# Patient Record
Sex: FEMALE | Race: AMERICAN INDIAN OR ALASKA NATIVE
[De-identification: names, ages, dates, MRNs, and addresses within clinical notes are randomized per-mention and may not be internally consistent; named-entity substitution may affect disease eponyms.]

---

## 2017-06-05 ENCOUNTER — HOSPITAL ENCOUNTER (OUTPATIENT)
Dept: HOSPITAL 5 - MAMMO | Age: 62
Discharge: HOME | End: 2017-06-05
Attending: FAMILY MEDICINE
Payer: COMMERCIAL

## 2017-06-05 DIAGNOSIS — Z87.891: ICD-10-CM

## 2017-06-05 DIAGNOSIS — Z12.31: Primary | ICD-10-CM

## 2017-06-05 DIAGNOSIS — I10: ICD-10-CM

## 2017-06-05 PROCEDURE — 77067 SCR MAMMO BI INCL CAD: CPT

## 2017-06-05 PROCEDURE — G0202 SCR MAMMO BI INCL CAD: HCPCS

## 2017-06-07 NOTE — MAMMOGRAPHY REPORT
BILATERAL MAMMOGRAM with CAD:



HISTORY:

Cancer screening. Comparison study is dated May 28, 2015.



FINDINGS:

The breast tissue is heterogeneously dense, which could obscure

detection of small masses (approximately 50%-75% glandular).  No mass, 

distortion, suspicious calcification, or skin change is seen.



IMPRESSION:

Negative mammogram.  There is no mammographic evidence of

malignancy.



RECOMMENDATION:

Follow-up per ACS guidelines.



BI-RADS CATEGORY:  1 = Negative



ACR BI-RADS MAMMOGRAPHIC CODES:

0 = Needs additional imaging evaluation; 1 = Negative; 2 = Benign; 3 = 

Probably benign; 4 = Suspicious; 5 = Malignant; 6 = Known biopsy-proven 

malignancy



COMMENT:

      1.   Dense breast tissue, i.e., adenosis, fibrocystic 

            changes, etc., may obscure an underlying neoplasm.

      2.   Approximately 10% of cancers are not detected with

            mammography.

      3.   A negative mammography report should not delay biopsy 

            if a clinically suspicious mass is present.



COMMENT:

Patient follow-up letters are generated in [employer].

## 2018-08-13 ENCOUNTER — HOSPITAL ENCOUNTER (OUTPATIENT)
Dept: HOSPITAL 5 - MAMMO | Age: 63
Discharge: HOME | End: 2018-08-13
Attending: FAMILY MEDICINE
Payer: COMMERCIAL

## 2018-08-13 DIAGNOSIS — Z12.31: Primary | ICD-10-CM

## 2018-08-13 PROCEDURE — 77067 SCR MAMMO BI INCL CAD: CPT

## 2018-08-13 NOTE — MAMMOGRAPHY REPORT
BILATERAL DIGITAL SCREENING MAMMOGRAM with CAD: 08/13/18 08:01:00



CLINICAL: Routine screening.



COMPARISON:06/05/17



FINDINGS: The breasts are heterogeneously dense, which may obscure 

small masses. No mass, architectural distortion or suspicious 

calcifications.



IMPRESSION: No mammographic evidence of malignancy.



BI-RADS CATEGORY: 1 - - Negative



RECOMMENDATION: Routine mammographic screening in one year.





COMMENT:

Patient follow-up letters are generated by our "[employer] application.

## 2019-09-27 ENCOUNTER — HOSPITAL ENCOUNTER (OUTPATIENT)
Dept: HOSPITAL 5 - MAMMO | Age: 64
Discharge: HOME | End: 2019-09-27
Attending: FAMILY MEDICINE
Payer: COMMERCIAL

## 2019-09-27 DIAGNOSIS — Z12.31: Primary | ICD-10-CM

## 2019-09-27 PROCEDURE — 77067 SCR MAMMO BI INCL CAD: CPT

## 2019-09-30 NOTE — MAMMOGRAPHY REPORT
DIGITAL SCREENING MAMMOGRAM WITH CAD, 9/27/2019



INDICATION: Routine screening mammography. 



TECHNIQUE:  Digital bilateral  2D mammography was obtained in the craniocaudal and mediolateral obliq
ue projections. This examination was interpreted with the benefit of Computer-Aided Detection analysi
s.



COMPARISON: 8/13/2018



FINDINGS: 



Breast Density: The breasts are heterogeneously dense, which may obscure small masses.



There is no evidence of dominant mass, suspicious calcifications or architectural distortion in eithe
r breast. A few scattered bilateral benign calcifications.



IMPRESSION: No mammographic evidence of malignancy.



Follow up recommendation: Routine yearly



BI-RADS Category 2:  Benign.



A "normal" or negative report should not discourage follow up or biopsy of a clinically significant f
inding.



A written summary of these findings will be mailed to the patient. The patient will be entered into a
 mammography reporting system which will generate a reminder letter for the patient's next appointmen
t at the appropriate interval.



The American College of Radiology recommends yearly mammograms starting at age 40 and continuing as l
augustine as a woman is in good health.  Breast MRI is recommended for women with an approximate 20-25% or 
greater lifetime risk of breast cancer, including women with a strong family history of breast or ova
daniela cancer or who have been treated for Hodgkin's disease.



Signer Name: Serafin Gaffney MD 

Signed: 9/30/2019 10:30 AM

 Workstation Name: [id number]

## 2020-09-28 ENCOUNTER — HOSPITAL ENCOUNTER (OUTPATIENT)
Dept: HOSPITAL 5 - MAMMO | Age: 65
Discharge: HOME | End: 2020-09-28
Attending: FAMILY MEDICINE
Payer: COMMERCIAL

## 2020-09-28 DIAGNOSIS — Z12.31: Primary | ICD-10-CM

## 2020-09-28 PROCEDURE — 77067 SCR MAMMO BI INCL CAD: CPT

## 2020-09-28 NOTE — MAMMOGRAPHY REPORT
DIGITAL SCREENING MAMMOGRAM WITH CAD, 9/28/2020



INDICATION: Routine screening mammography. 



TECHNIQUE:  Digital bilateral  2D mammography was obtained in the craniocaudal and mediolateral obliq
ue projections. This examination was interpreted with the benefit of Computer-Aided Detection analysi
s.



COMPARISON: Prior mammograms 9/27/2019 and 8/13/2018



FINDINGS: 



Breast Density: The breasts are heterogeneously dense, which may obscure small masses.



There is no evidence of dominant mass, suspicious calcifications or architectural distortion in eithe
r breast. There has been no significant change compared with the prior examinations.



IMPRESSION:



Follow up recommendation: Routine yearly



BI-RADS Category 1:  Negative.



A "normal" or negative report should not discourage follow up or biopsy of a clinically significant f
inding.



A written summary of these findings will be mailed to the patient. The patient will be entered into a
 mammography reporting system which will generate a reminder letter for the patient's next appointmen
t at the appropriate interval.



The American College of [employer]iology recommends yearly mammograms starting at age 40 and continuing as l
augustine as a woman is in good health.  Breast MRI is recommended for women with an approximate 20-25% or 
greater lifetime risk of breast cancer, including women with a strong family history of breast or ova
daniela cancer or who have been treated for Hodgkin's disease.



Signer Name: Eva Renae MD 

Signed: 9/28/2020 10:20 AM

Workstation Name: [id number]

## 2021-06-09 ENCOUNTER — HOSPITAL ENCOUNTER (INPATIENT)
Dept: HOSPITAL 5 - ED | Age: 66
LOS: 7 days | Discharge: ANOTHER HOSPITAL | DRG: 286 | End: 2021-06-16
Attending: INTERNAL MEDICINE | Admitting: INTERNAL MEDICINE
Payer: COMMERCIAL

## 2021-06-09 DIAGNOSIS — Z79.899: ICD-10-CM

## 2021-06-09 DIAGNOSIS — I65.29: ICD-10-CM

## 2021-06-09 DIAGNOSIS — Z87.891: ICD-10-CM

## 2021-06-09 DIAGNOSIS — I25.10: ICD-10-CM

## 2021-06-09 DIAGNOSIS — G89.29: ICD-10-CM

## 2021-06-09 DIAGNOSIS — E78.5: ICD-10-CM

## 2021-06-09 DIAGNOSIS — Z83.3: ICD-10-CM

## 2021-06-09 DIAGNOSIS — I50.31: ICD-10-CM

## 2021-06-09 DIAGNOSIS — J18.9: ICD-10-CM

## 2021-06-09 DIAGNOSIS — J90: ICD-10-CM

## 2021-06-09 DIAGNOSIS — Z79.891: ICD-10-CM

## 2021-06-09 DIAGNOSIS — E11.65: ICD-10-CM

## 2021-06-09 DIAGNOSIS — N17.0: ICD-10-CM

## 2021-06-09 DIAGNOSIS — Z79.82: ICD-10-CM

## 2021-06-09 DIAGNOSIS — E87.2: ICD-10-CM

## 2021-06-09 DIAGNOSIS — J96.01: ICD-10-CM

## 2021-06-09 DIAGNOSIS — I42.9: ICD-10-CM

## 2021-06-09 DIAGNOSIS — E11.51: ICD-10-CM

## 2021-06-09 DIAGNOSIS — I11.0: Primary | ICD-10-CM

## 2021-06-09 DIAGNOSIS — R77.8: ICD-10-CM

## 2021-06-09 DIAGNOSIS — R79.89: ICD-10-CM

## 2021-06-09 DIAGNOSIS — M54.9: ICD-10-CM

## 2021-06-09 DIAGNOSIS — Z82.49: ICD-10-CM

## 2021-06-09 LAB
ALBUMIN SERPL-MCNC: 3.9 G/DL (ref 3.9–5)
ALT SERPL-CCNC: 29 UNITS/L (ref 7–56)
BASOPHILS # (AUTO): 0.1 K/MM3 (ref 0–0.1)
BASOPHILS NFR BLD AUTO: 0.9 % (ref 0–1.8)
BUN SERPL-MCNC: 27 MG/DL (ref 7–17)
BUN/CREAT SERPL: 17 %
CALCIUM SERPL-MCNC: 9.1 MG/DL (ref 8.4–10.2)
EOSINOPHIL # BLD AUTO: 0.1 K/MM3 (ref 0–0.4)
EOSINOPHIL NFR BLD AUTO: 1.1 % (ref 0–4.3)
HCT VFR BLD CALC: 34.9 % (ref 30.3–42.9)
HDLC SERPL-MCNC: 53 MG/DL (ref 40–59)
HEMOLYSIS INDEX: 8
HGB BLD-MCNC: 11.4 GM/DL (ref 10.1–14.3)
LYMPHOCYTES # BLD AUTO: 1.3 K/MM3 (ref 1.2–5.4)
LYMPHOCYTES NFR BLD AUTO: 14.7 % (ref 13.4–35)
MCHC RBC AUTO-ENTMCNC: 33 % (ref 30–34)
MCV RBC AUTO: 95 FL (ref 79–97)
MONOCYTES # (AUTO): 0.7 K/MM3 (ref 0–0.8)
MONOCYTES % (AUTO): 8.6 % (ref 0–7.3)
PLATELET # BLD: 412 K/MM3 (ref 140–440)
RBC # BLD AUTO: 3.69 M/MM3 (ref 3.65–5.03)

## 2021-06-09 PROCEDURE — 85610 PROTHROMBIN TIME: CPT

## 2021-06-09 PROCEDURE — 80048 BASIC METABOLIC PNL TOTAL CA: CPT

## 2021-06-09 PROCEDURE — 85027 COMPLETE CBC AUTOMATED: CPT

## 2021-06-09 PROCEDURE — 87040 BLOOD CULTURE FOR BACTERIA: CPT

## 2021-06-09 PROCEDURE — 71046 X-RAY EXAM CHEST 2 VIEWS: CPT

## 2021-06-09 PROCEDURE — 76770 US EXAM ABDO BACK WALL COMP: CPT

## 2021-06-09 PROCEDURE — C1894 INTRO/SHEATH, NON-LASER: HCPCS

## 2021-06-09 PROCEDURE — 81001 URINALYSIS AUTO W/SCOPE: CPT

## 2021-06-09 PROCEDURE — 87641 MR-STAPH DNA AMP PROBE: CPT

## 2021-06-09 PROCEDURE — 80053 COMPREHEN METABOLIC PANEL: CPT

## 2021-06-09 PROCEDURE — 93454 CORONARY ARTERY ANGIO S&I: CPT

## 2021-06-09 PROCEDURE — 96375 TX/PRO/DX INJ NEW DRUG ADDON: CPT

## 2021-06-09 PROCEDURE — 93017 CV STRESS TEST TRACING ONLY: CPT

## 2021-06-09 PROCEDURE — 93306 TTE W/DOPPLER COMPLETE: CPT

## 2021-06-09 PROCEDURE — 96365 THER/PROPH/DIAG IV INF INIT: CPT

## 2021-06-09 PROCEDURE — 84300 ASSAY OF URINE SODIUM: CPT

## 2021-06-09 PROCEDURE — 36415 COLL VENOUS BLD VENIPUNCTURE: CPT

## 2021-06-09 PROCEDURE — 99406 BEHAV CHNG SMOKING 3-10 MIN: CPT

## 2021-06-09 PROCEDURE — A9502 TC99M TETROFOSMIN: HCPCS

## 2021-06-09 PROCEDURE — 82962 GLUCOSE BLOOD TEST: CPT

## 2021-06-09 PROCEDURE — G0378 HOSPITAL OBSERVATION PER HR: HCPCS

## 2021-06-09 PROCEDURE — 82570 ASSAY OF URINE CREATININE: CPT

## 2021-06-09 PROCEDURE — 85025 COMPLETE CBC W/AUTO DIFF WBC: CPT

## 2021-06-09 PROCEDURE — 80061 LIPID PANEL: CPT

## 2021-06-09 PROCEDURE — 93005 ELECTROCARDIOGRAM TRACING: CPT

## 2021-06-09 PROCEDURE — 71045 X-RAY EXAM CHEST 1 VIEW: CPT

## 2021-06-09 PROCEDURE — 84484 ASSAY OF TROPONIN QUANT: CPT

## 2021-06-09 PROCEDURE — 83880 ASSAY OF NATRIURETIC PEPTIDE: CPT

## 2021-06-09 PROCEDURE — 78452 HT MUSCLE IMAGE SPECT MULT: CPT

## 2021-06-09 NOTE — XRAY REPORT
CHEST 2 VIEWS 



INDICATION / CLINICAL INFORMATION: shortness of breath, cough.



COMPARISON: 7/2/2020



FINDINGS:



SUPPORT DEVICES: None.

HEART / MEDIASTINUM: Stable. 

LUNGS / PLEURA: Interval development of confluent infiltrate in the right lung base. Small bilateral 
pleural effusions, right minimally greater than left.. No pneumothorax. 



ADDITIONAL FINDINGS: No significant additional findings.



IMPRESSION:

1. Interval development of confluent infiltrate in the right lung base likely represents an infectiou
s process.

2. Interval development of small bilateral pleural effusions with associated compressive atelectasis.




Signer Name: Zak Escobedo MD 

Signed: 6/9/2021 2:03 PM

Workstation Name: [id number]

## 2021-06-09 NOTE — EVENT NOTE
ED Screening Note


Date of service: 06/09/21


Time: 13:37


ED Screening Note: 





Patient complains of shortness of breath starting last night


States she has had a nonproductive cough for 1 week


Denies any chest pain or fever


History of diabetes and hypertension


No recent long travel, hemoptysis, leg pain/swelling, or history of DVT/PE





This initial assessment/diagnostic orders/clinical plan/treatment(s) is/are 

subject to change based on patients health status, clinical progression and 

re-assessment by fellow clinical providers in the ED. Further treatment and 

workup at subsequent clinical providers discretion. Patient/guardian urged not 

to elope from the ED as their condition may be serious if not clinically 

assessed and managed. 





Initial orders include: 


Labs


Chest x-ray

## 2021-06-09 NOTE — EMERGENCY DEPARTMENT REPORT
ED Shortness of Breath HPI





- General


Chief Complaint: Dyspnea/Respdistress


Stated Complaint: SOB


Time Seen by Provider: 06/09/21 13:36


Source: patient


Mode of arrival: Ambulatory


Limitations: No Limitations





- History of Present Illness


Initial Comments: 





Patient is a 65-year-old female that presents emergency room with complaints of 

shortness of breath.  Patient states shortness of breath started yesterday.  

Patient states her shortness of breath is worse.  Patient also complains of 

fatigue.  Patient states approximate week ago she had a cough and cold but that 

has improved and now she is just has a cough and shortness of breath.  Patient 

states she has not been tested for COVID-19.  Patient dates she has had both of 

her doses of her COVID-19 vaccine.  Patient states her shortness breath better 

with rest and worse with exertion.  Patient states her fatigue is better with 

rest and worse with exertion and movement.  Patient denies fever and chills.








Patient denies recent travel.  Patient denies recent international travel.  

Patient denies exposure to the novel coronavirus.  Patient denies sick contacts.

 Patient denies fever and chills.  Patient denies loss of smell..  Patient 

denies diarrhea.  Patient denies coming in contact with anybody with symptoms of

the novel coronavirus.





Patient denies chest pain. 








MD Complaint: shortness of breath


-: Sudden





- Related Data


                                Home Medications











 Medication  Instructions  Recorded  Confirmed  Last Taken


 


Bisoprolol/Hctz (Nf) [Ziac 5/6.25 1 tab PO QDAY 08/30/16 07/02/20 11/08/16 05:00





(Nf)]    


 


Ibuprofen [Motrin 800 MG tab] 800 mg PO TID 08/30/16 07/02/20 11/06/16





    400 MG


 


Multivit-Min/FA/Lycopen/Lutein 1 each PO QDAY 08/30/16 07/02/20 11/07/16





[Centrum Silver Tablet]    


 


Nesina 25 mg PO DAILY 09/02/16 07/02/20 11/07/16








                                  Previous Rx's











 Medication  Instructions  Recorded  Last Taken  Type


 


lisinopriL [Zestril TAB] 20 mg PO QDAY #30 tablet 02/14/14 Unknown Rx


 


HYDROcodone/APAP 5-325 [[place] 1 - 2 each PO Q4HR PRN #30 tablet 11/08/16 Unknown

 Rx





5-325 mg TAB]    


 


Aspirin EC [Ecotrin] 325 mg PO QDAY #30 tablet 07/05/20 Unknown Rx


 


NIFEdipine XL [Procardia Xl] 30 mg PO Q12HR #60 tablet 07/05/20 Unknown Rx


 


Pravastatin [Pravachol] 80 mg PO QHS #30 tablet 07/05/20 Unknown Rx


 


hydrALAZINE [Apresoline TAB] 75 mg PO Q8HR #90 tablet 07/05/20 Unknown Rx











                                    Allergies











Allergy/AdvReac Type Severity Reaction Status Date / Time


 


No Known Allergies Allergy   Verified 02/12/14 15:34














ED Review of Systems


ROS: 


Stated complaint: SOB


Other details as noted in HPI





Constitutional: denies: chills, fever


Eyes: denies: eye pain, eye discharge, vision change


ENT: denies: ear pain, throat pain


Respiratory: see HPI, cough, shortness of breath, SOB with exertion, SOB at 

rest.  denies: wheezing


Cardiovascular: denies: chest pain, palpitations


Endocrine: no symptoms reported


Gastrointestinal: denies: abdominal pain, nausea, diarrhea


Genitourinary: denies: urgency, dysuria, discharge


Musculoskeletal: denies: back pain, joint swelling, arthralgia


Skin: denies: rash, lesions


Neurological: denies: headache, weakness, paresthesias


Psychiatric: denies: anxiety, depression


Hematological/Lymphatic: denies: easy bleeding, easy bruising





ED Past Medical Hx





- Past Medical History


Previous Medical History?: Yes


Hx Hypertension: Yes


Hx Heart Attack/AMI: No


Hx Congestive Heart Failure: No


Hx Diabetes: Yes


Hx Deep Vein Thrombosis: No


Hx Pulmonary Embolism: No


Hx GERD: No


Hx Liver Disease: No


Hx Renal Disease: No


Hx Sickle Cell Disease: No


Hx Arthritis: No


Hx Headaches / Migraines: No


Hx Seizures: No


Hx Kidney Stones: No


Hx Asthma: No


Hx COPD: No


Hx Tuberculosis: No


Hx Dementia: No


Hx HIV: No


Additional medical history: hyperlipidemia





- Surgical History


Past Surgical History?: No


Hx Coronary Stent: No


Hx Open Heart Surgery: No


Hx Pacemaker: No


Hx Internal Defibrillator: No


Hx Cholecystectomy: No


Hx Appendectomy: No


Hx Breast Surgery: No





- Family History


Family history: no significant





- Social History


Smoking Status: Former Smoker


Substance Use Type: None





- Medications


Home Medications: 


                                Home Medications











 Medication  Instructions  Recorded  Confirmed  Last Taken  Type


 


lisinopriL [Zestril TAB] 20 mg PO QDAY #30 tablet 02/14/14 07/02/20 Unknown Rx


 


Bisoprolol/Hctz (Nf) [Ziac 5/6.25 1 tab PO QDAY 08/30/16 07/02/20 11/08/16 05:00

 History





(Nf)]     


 


Ibuprofen [Motrin 800 MG tab] 800 mg PO TID 08/30/16 07/02/20 11/06/16 History





    400 MG 


 


Multivit-Min/FA/Lycopen/Lutein 1 each PO QDAY 08/30/16 07/02/20 11/07/16 History





[Centrum Silver Tablet]     


 


Nesina 25 mg PO DAILY 09/02/16 07/02/20 11/07/16 History


 


HYDROcodone/APAP 5-325 [[place] 1 - 2 each PO Q4HR PRN #30 tablet 11/08/16 07/02/20 Unknown Rx





5-325 mg TAB]     


 


Aspirin EC [Ecotrin] 325 mg PO QDAY #30 tablet 07/05/20  Unknown Rx


 


NIFEdipine XL [Procardia Xl] 30 mg PO Q12HR #60 tablet 07/05/20  Unknown Rx


 


Pravastatin [Pravachol] 80 mg PO QHS #30 tablet 07/05/20  Unknown Rx


 


hydrALAZINE [Apresoline TAB] 75 mg PO Q8HR #90 tablet 07/05/20  Unknown Rx














ED Physical Exam





- General


Limitations: No Limitations


General appearance: alert, in no apparent distress





- Head


Head exam: Present: atraumatic, normocephalic





- Eye


Eye exam: Present: normal appearance





- ENT


ENT exam: Present: mucous membranes moist





- Neck


Neck exam: Present: normal inspection





- Respiratory


Respiratory exam: Present: normal lung sounds bilaterally.  Absent: respiratory 

distress





- Cardiovascular


Cardiovascular Exam: Present: regular rate, normal rhythm.  Absent: systolic 

murmur, diastolic murmur, rubs, gallop





- GI/Abdominal


GI/Abdominal exam: Present: soft, normal bowel sounds





- Extremities Exam


Extremities exam: Present: normal inspection





- Back Exam


Back exam: Present: normal inspection





- Neurological Exam


Neurological exam: Present: alert, oriented X3





- Psychiatric


Psychiatric exam: Present: normal affect, normal mood





- Skin


Skin exam: Present: warm, dry, intact, normal color.  Absent: rash





ED Course


                                   Vital Signs











  06/09/21 06/09/21 06/09/21





  13:32 13:38 20:35


 


Temperature  99.2 F 


 


Pulse Rate 106 H 107 H 88


 


Respiratory  18 19





Rate   


 


Blood Pressure   


 


Blood Pressure  166/98 





[Right]   


 


O2 Sat by Pulse 98 98 98





Oximetry   














  06/09/21 06/09/21 06/09/21





  20:45 21:01 21:15


 


Temperature   


 


Pulse Rate 89 90 94 H


 


Respiratory 18 20 19





Rate   


 


Blood Pressure 154/98 165/113 165/103


 


Blood Pressure   





[Right]   


 


O2 Sat by Pulse 96 96 94





Oximetry   














  06/09/21 06/09/21





  21:31 21:45


 


Temperature  


 


Pulse Rate 91 H 93 H


 


Respiratory 19 18





Rate  


 


Blood Pressure 164/104 167/101


 


Blood Pressure  





[Right]  


 


O2 Sat by Pulse 95 96





Oximetry  














- Reevaluation(s)


Reevaluation #1: 


I discussed all results with patient.  I discussed plan of care with patient.  

Patient agrees with plan of care and admission.  Patient to be admitted to the 

hospitalist service.


06/09/21 23:08








- Consultations


Consultation #1: 


Hospitalist consulted for admission.  Hospitalist to admit patient.


06/09/21 23:08





Consultation #2: 


Nephrology consulted.


06/09/21 23:08








ED Medical Decision Making





- Lab Data


Result diagrams: 


                                 06/09/21 14:27





                                 06/09/21 14:27





- EKG Data


-: EKG Interpreted by Me


EKG shows normal: sinus rhythm, axis, intervals, QRS complexes, ST-T waves


Rate: normal





- EKG Data


Interpretation: LVH





- Radiology Data


Radiology results: report reviewed, image reviewed


interpreted by me: 





Chest x-ray:  pneumonia noted, no pneumothorax, no foreign body, no osseous 

findings, pulmonary edema noted











 


CHEST 2 VIEWS   


 


 INDICATION / CLINICAL INFORMATION: shortness of breath, cough.  


 


 COMPARISON: 7/2/2020  


 


 FINDINGS:  


 


 SUPPORT DEVICES: None.  


 HEART / MEDIASTINUM: Stable.   


 LUNGS / PLEURA: Interval development of confluent infiltrate in the right lung 

base. Small 


bilateral pleural effusions, right minimally greater than left.. No 

pneumothorax.   


 


 ADDITIONAL FINDINGS: No significant additional findings.  


 


 IMPRESSION:  


 1. Interval development of confluent infiltrate in the right lung base likely 

represents an 


infectious process.  


 2. Interval development of small bilateral pleural effusions with associated 

compressive 


atelectasis.  


=========================================================================











- Medical Decision Making





Patient is a 65-year-old female that presents emergency room with complaints of 

shortness of breath and cough.  Patient states she had a cold symptoms about a 

week ago but those have improved.  Patient states only symptoms she has 

shortness of breath cough fatigue.  Patient already had a COVID-19 vaccine.  

Patient had labs done which were consistent with acute renal failure and 

elevated troponin.  Patient also found to have an elevated BNP.  Patient had 

chest x-ray which shows pneumonia and pulmonary edema.  I personally reviewed 

the chest x-ray.  Patient had an EKG done which shows LVH and a normal sinus 

rhythm.  Patient does not have any ST changes.  I personally reviewed the EKG.  

Patient given antibiotics and IV Lasix in the ER.  Patient admitted to the 

hospital service for further evaluation treatment.





Critical care time documented due to the multiple reassessments, prolonged time 

at the bedside, interpretation of diagnostics and labs.











- Differential Diagnosis


Pneumonia, shortness of breath, cough, URI, CHF,


Critical Care Time: Yes


Critical care time in (mins) excluding proc time.: 35


Critical care attestation.: 


If time is entered above; I have spent that time in minutes in the direct care 

of this critically ill patient, excluding procedure time.





Critical Care Time: 





35 minutes











ED Disposition


Clinical Impression: 


 SOB (shortness of breath), Cough, New onset of congestive heart failure, 

Pulmonary edema cardiac cause, Elevated troponin I level





Renal failure


Qualifiers:


 Renal failure chronicity: acute Acute renal failure type: unspecified Qualified

 Code(s): N17.9 - Acute kidney failure, unspecified





Pneumonia


Qualifiers:


 Pneumonia type: due to unspecified organism Laterality: right Lung location: 

unspecified part of lung Qualified Code(s): J18.9 - Pneumonia, unspecified 

organism





Disposition: DC-09 OP ADMIT IP TO THIS HOSP


Is pt being admited?: Yes


Does the pt Need Aspirin: No


Condition: Stable


Time of Disposition: 23:10

## 2021-06-10 LAB
BUN SERPL-MCNC: 28 MG/DL (ref 7–17)
BUN/CREAT SERPL: 20 %
CALCIUM SERPL-MCNC: 9.5 MG/DL (ref 8.4–10.2)
HEMOLYSIS INDEX: 20

## 2021-06-10 RX ADMIN — FUROSEMIDE SCH MG: 10 INJECTION, SOLUTION INTRAVENOUS at 06:09

## 2021-06-10 RX ADMIN — HEPARIN SODIUM SCH UNIT: 5000 INJECTION, SOLUTION INTRAVENOUS; SUBCUTANEOUS at 10:45

## 2021-06-10 RX ADMIN — ASPIRIN SCH MG: 325 TABLET, COATED ORAL at 10:39

## 2021-06-10 RX ADMIN — FUROSEMIDE SCH MG: 10 INJECTION, SOLUTION INTRAVENOUS at 18:55

## 2021-06-10 RX ADMIN — INSULIN LISPRO SCH UNIT: 100 INJECTION, SOLUTION INTRAVENOUS; SUBCUTANEOUS at 12:00

## 2021-06-10 RX ADMIN — LISINOPRIL SCH MG: 20 TABLET ORAL at 10:39

## 2021-06-10 RX ADMIN — DOCUSATE SODIUM SCH MG: 100 CAPSULE, LIQUID FILLED ORAL at 10:51

## 2021-06-10 RX ADMIN — DOCUSATE SODIUM SCH MG: 100 CAPSULE, LIQUID FILLED ORAL at 22:05

## 2021-06-10 RX ADMIN — INSULIN LISPRO SCH UNIT: 100 INJECTION, SOLUTION INTRAVENOUS; SUBCUTANEOUS at 10:52

## 2021-06-10 RX ADMIN — FAMOTIDINE SCH MG: 10 TABLET ORAL at 22:05

## 2021-06-10 RX ADMIN — Medication SCH ML: at 10:50

## 2021-06-10 RX ADMIN — INSULIN LISPRO SCH UNIT: 100 INJECTION, SOLUTION INTRAVENOUS; SUBCUTANEOUS at 18:57

## 2021-06-10 RX ADMIN — HEPARIN SODIUM SCH UNIT: 5000 INJECTION, SOLUTION INTRAVENOUS; SUBCUTANEOUS at 22:06

## 2021-06-10 RX ADMIN — METOPROLOL TARTRATE SCH MG: 25 TABLET, FILM COATED ORAL at 22:02

## 2021-06-10 RX ADMIN — INSULIN LISPRO SCH: 100 INJECTION, SOLUTION INTRAVENOUS; SUBCUTANEOUS at 22:06

## 2021-06-10 RX ADMIN — FAMOTIDINE SCH MG: 10 TABLET ORAL at 10:39

## 2021-06-10 RX ADMIN — CEFTRIAXONE SODIUM SCH MLS/HR: 1 INJECTION, POWDER, FOR SOLUTION INTRAMUSCULAR; INTRAVENOUS at 10:31

## 2021-06-10 RX ADMIN — NIFEDIPINE SCH MG: 30 TABLET, FILM COATED, EXTENDED RELEASE ORAL at 22:05

## 2021-06-10 RX ADMIN — NIFEDIPINE SCH MG: 30 TABLET, FILM COATED, EXTENDED RELEASE ORAL at 10:46

## 2021-06-10 RX ADMIN — Medication SCH ML: at 22:07

## 2021-06-10 RX ADMIN — METOPROLOL TARTRATE SCH MG: 25 TABLET, FILM COATED ORAL at 10:55

## 2021-06-10 RX ADMIN — PRAVASTATIN SODIUM SCH MG: 80 TABLET ORAL at 22:05

## 2021-06-10 NOTE — HISTORY AND PHYSICAL REPORT
History of Present Illness


Date of examination: 06/09/21


Date of admission: 


06/09/2021


Chief complaint: 





SOB


History of present illness: 





65-year-old -American female who was a former smoker (quit 6 months ago) 

with history of hypertension, DM2, HLD, and chronic back pain who presents [facility] 

ED with complaints of cough and shortness of breath.  Patient complains of 

nonproductive cough x1 week, and thinks that she might have a cold.  

Additionally she complains of progressively worsening shortness of breath which 

started approximately 1 day ago.  Her shortness of breath is exacerbated with 

exertion and relieved with rest.  Denies bilateral lower extremity edema, 

peripheral edema, PND, or orthopnea.  Endorses receiving both doses of COVID-19 

vaccine.  Patient also complains of fatigue with exertion.  Denies any new 

exercise regiment or strenuous activity.  States that she quit smoking 6 months 

ago, and does not use any other form of tobacco products at this time.  Endorses

compliance with meds.





Denies chest pain, sputum production, hemoptysis, fever, chills, abdominal pain,

nausea, vomiting, diarrhea, loss of smell, loss of taste, hematuria, recent 

travel, or recent sick contacts.





Review of medical record shows patient was admitted in July of last year for 

syncope.  Work-up included bilateral carotid Doppler which revealed left greater

than 70% carotid stenosis.  Patient was evaluated by vascular surgery and 

advised to follow-up as outpatient.  Is unclear if patient follow-up as 

instructed.











Past History


Past Medical History: diabetes, hypertension, hyperlipidemia, other (chronic 

back pain)


Past Surgical History: No surgical history


Social history: , lives with family, full code, other (former smoker quit

6 months ago).  denies: alcohol abuse, prescription drug abuse, IV drug use


Family history: hypertension





Medications and Allergies


                                    Allergies











Allergy/AdvReac Type Severity Reaction Status Date / Time


 


No Known Allergies Allergy   Verified 02/12/14 15:34











                                Home Medications











 Medication  Instructions  Recorded  Confirmed  Last Taken  Type


 


lisinopriL [Zestril TAB] 20 mg PO QDAY #30 tablet 02/14/14 07/02/20 Unknown Rx


 


Bisoprolol/Hctz (Nf) [Ziac 5/6.25 1 tab PO QDAY 08/30/16 07/02/20 11/08/16 05:00

 History





(Nf)]     


 


Ibuprofen [Motrin 800 MG tab] 800 mg PO TID 08/30/16 07/02/20 11/06/16 History





    400 MG 


 


Multivit-Min/FA/Lycopen/Lutein 1 each PO QDAY 08/30/16 07/02/20 11/07/16 History





[Centrum Silver Tablet]     


 


Nesina 25 mg PO DAILY 09/02/16 07/02/20 11/07/16 History


 


HYDROcodone/APAP 5-325 [[place] 1 - 2 each PO Q4HR PRN #30 tablet 11/08/16 07/02/20 Unknown Rx





5-325 mg TAB]     


 


Aspirin EC [Ecotrin] 325 mg PO QDAY #30 tablet 07/05/20  Unknown Rx


 


NIFEdipine XL [Procardia Xl] 30 mg PO Q12HR #60 tablet 07/05/20  Unknown Rx


 


Pravastatin [Pravachol] 80 mg PO QHS #30 tablet 07/05/20  Unknown Rx


 


hydrALAZINE [Apresoline TAB] 75 mg PO Q8HR #90 tablet 07/05/20  Unknown Rx











Active Meds: 


Active Medications





Acetaminophen (Acetaminophen 325 Mg Tab)  650 mg PO Q4H PRN


   PRN Reason: Pain MILD(1-3)/Fever >100.5/HA


Albuterol (Albuterol 2.5 Mg/3 Ml Nebu)  2.5 mg IH Q3HRT PRN


   PRN Reason: Shortness Of Breath


Aspirin (Aspirin Ec 325 Mg Tab)  325 mg PO QDAY [facility]


Carvedilol (Carvedilol 6.25 Mg Tab)  6.25 mg PO BID [facility]


Dextrose (Dextrose 50% In Water (25gm) 50 Ml Syringe)  50 ml IV Q30MIN PRN; 

Protocol


   PRN Reason: Hypoglycemia


Docusate Sodium (Docusate Sodium 100 Mg Cap)  100 mg PO BID [facility]


Furosemide (Furosemide 40 Mg/4 Ml Inj)  40 mg IV BID@0600,1800 [facility]


Guaifenesin (Guaifenesin Er 600 Mg Tab)  600 mg PO BID [facility]


Heparin Sodium (Porcine) (Heparin 5,000 Unit/1 Ml Vial)  5,000 unit SUB-Q BID 

[facility]


Hydralazine HCl (Hydralazine 25 Mg Tab)  75 mg PO Q8HR [facility]


Ceftriaxone Sodium (Rocephin/Ns 1 Gm/50 Ml)  1 gm in 50 mls @ 100 mls/hr IV 

Q24HR [facility]; Protocol


Insulin Human Lispro (Insulin Lispro 100 Unit/Ml)  0 unit SUB-Q ACHS [facility]; 

Protocol


Lisinopril (Lisinopril 20 Mg Tab)  20 mg PO QDAY [facility]


Nitroglycerin (Nitroglycerin 0.4 Mg Tab Subl)  0.4 mg SL .Q5MIN PRN


   PRN Reason: Chest Pain


Ondansetron HCl (Ondansetron 4 Mg/2 Ml Inj)  4 mg IV Q6H PRN


   PRN Reason: Nausea And Vomiting


Pravastatin Sodium (Pravastatin 80 Mg Tab)  80 mg PO QHS [facility]


Sodium Chloride (Sodium Chloride 0.9% 10 Ml Flush Syringe)  10 ml IV BID [facility]


Sodium Chloride (Sodium Chloride 0.9% 10 Ml Flush Syringe)  10 ml IV PRN PRN


   PRN Reason: LINE FLUSH











Review of Systems


All systems: negative (As noted in HPI)





Exam





- Physical Exam


Narrative exam: 





Physical exam





General appearance: Present: No acute distress, alert and oriented 3,  

American adult female





- EENT


Eyes: Present: PERRL, EOM intact


ENT: hearing intact, normal dentition





- Neck


Neck: Present: supple, normal ROM





- Respiratory


Respiratory effort: Non-labored


Respiratory: Right basilar crackles, otherwise clear throughout





- Cardiovascular


Heart rate: 91 (bpm)


Rhythm: Sinus 


Heart Sounds: Present: S1 & S2.  Absent: rub, click





- Extremities


Extremities: no ischemia, pulses intact, 





- Peripheral Assessment


Peripheral Pulses: within normal limits


 


- Abdominal


General gastrointestinal: soft, non-tender, normal bowel sounds





- Integumentary


Integumentary: Present: warm, dry





- Musculoskeletal


Musculoskeletal: Able to move all extremities





-Neurological


Neurological: CN II-XII intact





- Psychiatric


Psychiatric: Appropriate for situation ,cooperative





- Constitutional


Vitals: 


                                        











Temp Pulse Resp BP Pulse Ox


 


 99.2 F   93 H  18   167/101   96 


 


 06/09/21 13:38  06/09/21 21:45  06/09/21 21:45  06/09/21 21:45  06/09/21 21:45














HEART Score





- HEART Score


History: Moderately suspicious


EKG: Non-specific


Age: 45-65


Risk factors: > 3 risk factors or hx of atherosclerotic disease


Troponin: 


                                        











WBC  8.5 K/mm3 (4.5-11.0)   06/09/21  14:27    


 


RBC  3.69 M/mm3 (3.65-5.03)   06/09/21  14:27    


 


Hgb  11.4 gm/dl (10.1-14.3)   06/09/21  14:27    


 


Hct  34.9 % (30.3-42.9)   06/09/21  14:27    


 


MCV  95 fl (79-97)   06/09/21  14:27    


 


MCH  31 pg (28-32)   06/09/21  14:27    


 


MCHC  33 % (30-34)   06/09/21  14:27    


 


RDW  13.8 % (13.2-15.2)   06/09/21  14:27    


 


Plt Count  412 K/mm3 (140-440)   06/09/21  14:27    


 


Lymph % (Auto)  14.7 % (13.4-35.0)   06/09/21  14:27    


 


Mono % (Auto)  8.6 % (0.0-7.3)  H  06/09/21  14:27    


 


Eos % (Auto)  1.1 % (0.0-4.3)   06/09/21  14:27    


 


Baso % (Auto)  0.9 % (0.0-1.8)   06/09/21  14:27    


 


Lymph # (Auto)  1.3 K/mm3 (1.2-5.4)   06/09/21  14:27    


 


Mono # (Auto)  0.7 K/mm3 (0.0-0.8)   06/09/21  14:27    


 


Eos # (Auto)  0.1 K/mm3 (0.0-0.4)   06/09/21  14:27    


 


Baso # (Auto)  0.1 K/mm3 (0.0-0.1)   06/09/21  14:27    


 


Seg Neutrophils %  74.7 % (40.0-70.0)  H  06/09/21  14:27    


 


Seg Neutrophils #  6.4 K/mm3 (1.8-7.7)   06/09/21  14:27    


 


Sodium  136 mmol/L (137-145)  L  06/09/21  14:27    


 


Potassium  4.7 mmol/L (3.6-5.0)   06/09/21  14:27    


 


Chloride  100.4 mmol/L ()   06/09/21  14:27    


 


Carbon Dioxide  19 mmol/L (22-30)  L  06/09/21  14:27    


 


Anion Gap  21 mmol/L  06/09/21  14:27    


 


BUN  27 mg/dL (7-17)  H  06/09/21  14:27    


 


Creatinine  1.6 mg/dL (0.6-1.2)  H  06/09/21  14:27    


 


Estimated GFR  39 ml/min  06/09/21  14:27    


 


BUN/Creatinine Ratio  17 %  06/09/21  14:27    


 


Glucose  201 mg/dL ()  H  06/09/21  14:27    


 


Calcium  9.1 mg/dL (8.4-10.2)   06/09/21  14:27    


 


Total Bilirubin  0.30 mg/dL (0.1-1.2)   06/09/21  14:27    


 


AST  22 units/L (5-40)   06/09/21  14:27    


 


ALT  29 units/L (7-56)   06/09/21  14:27    


 


Alkaline Phosphatase  73 units/L ()   06/09/21  14:27    


 


Troponin T  0.030 ng/mL (0.00-0.029)  H  06/09/21  21:37    


 


NT-Pro-B Natriuret Pep  9110 pg/mL (0-900)  H  06/09/21  14:27    


 


Total Protein  7.2 g/dL (6.3-8.2)   06/09/21  14:27    


 


Albumin  3.9 g/dL (3.9-5)   06/09/21  14:27    


 


Albumin/Globulin Ratio  1.2 %  06/09/21  14:27    


 


Triglycerides  92 mg/dL (2-149)   06/09/21  21:37    


 


Cholesterol  142 mg/dL ()   06/09/21  21:37    


 


LDL Cholesterol Direct  91 mg/dL ()   06/09/21  21:37    


 


HDL Cholesterol  53 mg/dL (40-59)   06/09/21  21:37    


 


Cholesterol/HDL Ratio  2.67 %  06/09/21  21:37    














Results





- Labs


CBC & Chem 7: 


                                 06/09/21 14:27





                                 06/09/21 14:27


Labs: 


                             Laboratory Last Values











WBC  8.5 K/mm3 (4.5-11.0)   06/09/21  14:27    


 


RBC  3.69 M/mm3 (3.65-5.03)   06/09/21  14:27    


 


Hgb  11.4 gm/dl (10.1-14.3)   06/09/21  14:27    


 


Hct  34.9 % (30.3-42.9)   06/09/21  14:27    


 


MCV  95 fl (79-97)   06/09/21  14:27    


 


MCH  31 pg (28-32)   06/09/21  14:27    


 


MCHC  33 % (30-34)   06/09/21  14:27    


 


RDW  13.8 % (13.2-15.2)   06/09/21  14:27    


 


Plt Count  412 K/mm3 (140-440)   06/09/21  14:27    


 


Lymph % (Auto)  14.7 % (13.4-35.0)   06/09/21  14:27    


 


Mono % (Auto)  8.6 % (0.0-7.3)  H  06/09/21  14:27    


 


Eos % (Auto)  1.1 % (0.0-4.3)   06/09/21  14:27    


 


Baso % (Auto)  0.9 % (0.0-1.8)   06/09/21  14:27    


 


Lymph # (Auto)  1.3 K/mm3 (1.2-5.4)   06/09/21  14:27    


 


Mono # (Auto)  0.7 K/mm3 (0.0-0.8)   06/09/21  14:27    


 


Eos # (Auto)  0.1 K/mm3 (0.0-0.4)   06/09/21  14:27    


 


Baso # (Auto)  0.1 K/mm3 (0.0-0.1)   06/09/21  14:27    


 


Seg Neutrophils %  74.7 % (40.0-70.0)  H  06/09/21  14:27    


 


Seg Neutrophils #  6.4 K/mm3 (1.8-7.7)   06/09/21  14:27    


 


Sodium  136 mmol/L (137-145)  L  06/09/21  14:27    


 


Potassium  4.7 mmol/L (3.6-5.0)   06/09/21  14:27    


 


Chloride  100.4 mmol/L ()   06/09/21  14:27    


 


Carbon Dioxide  19 mmol/L (22-30)  L  06/09/21  14:27    


 


Anion Gap  21 mmol/L  06/09/21  14:27    


 


BUN  27 mg/dL (7-17)  H  06/09/21  14:27    


 


Creatinine  1.6 mg/dL (0.6-1.2)  H  06/09/21  14:27    


 


Estimated GFR  39 ml/min  06/09/21  14:27    


 


BUN/Creatinine Ratio  17 %  06/09/21  14:27    


 


Glucose  201 mg/dL ()  H  06/09/21  14:27    


 


Calcium  9.1 mg/dL (8.4-10.2)   06/09/21  14:27    


 


Total Bilirubin  0.30 mg/dL (0.1-1.2)   06/09/21  14:27    


 


AST  22 units/L (5-40)   06/09/21  14:27    


 


ALT  29 units/L (7-56)   06/09/21  14:27    


 


Alkaline Phosphatase  73 units/L ()   06/09/21  14:27    


 


Troponin T  0.030 ng/mL (0.00-0.029)  H  06/09/21  21:37    


 


NT-Pro-B Natriuret Pep  9110 pg/mL (0-900)  H  06/09/21  14:27    


 


Total Protein  7.2 g/dL (6.3-8.2)   06/09/21  14:27    


 


Albumin  3.9 g/dL (3.9-5)   06/09/21  14:27    


 


Albumin/Globulin Ratio  1.2 %  06/09/21  14:27    


 


Triglycerides  92 mg/dL (2-149)   06/09/21  21:37    


 


Cholesterol  142 mg/dL ()   06/09/21  21:37    


 


LDL Cholesterol Direct  91 mg/dL ()   06/09/21  21:37    


 


HDL Cholesterol  53 mg/dL (40-59)   06/09/21  21:37    


 


Cholesterol/HDL Ratio  2.67 %  06/09/21  21:37    














- Imaging and Cardiology


EKG: image reviewed


Chest x-ray: report reviewed, image reviewed





- Diagnostic Impressions


Diagnostic Impressions: 


CXR:


FINDINGS: SUPPORT DEVICES: None. HEART / MEDIASTINUM: Stable. LUNGS / PLEURA: 

Interval development of confluent infiltrate in the right lung base. Small b

ilateral pleural effusions, right minimally greater than left.. No pneumothorax.

ADDITIONAL FINDINGS: No significant additional findings. 





IMPRESSION: 


1. Interval development of confluent infiltrate in the right lung base likely 

represents an infectious process. 


2. Interval development of small bilateral pleural effusions with associated 

compressive atelectasis. 








Assessment and Plan


Assessment and plan: 




















Pneumonia


-CXR shows Interval development of confluent infiltrate in the right lung base 

likely represents an infectious process


-Blood Cultures pending


-Monitor CBC


-Start on IV Abx


-Supportive care





Acute CHF (new onset)


-BNP elevated at 9110


-c/o of progressively worsening shortness of breath, denies bilateral lower 

extremity edema, or peripheral edema


-Troponin elevated x2, now 0.030 (trending up from 0.027), will continue to 

trend


-??Troponin leak


-CXR shows Interval development of small bilateral pleural effusions with 

associated compressive atelectasis


-Continue ASA and ACE, will start on BB


-Start IV Lasix twice a day


-Echo pending


-Cardiology consulted





Elevated troponin


-Troponin elevated x2, now 0.030 (trending up from 0.027), will continue to 

trend


-??Troponin leak


-Echo pending


-Cardiology consulted





Dyspnea


-Monitor saturations


-Albuterol as needed


-Supplemental oxygen as needed





HTN


-Monitor BP


-Resume home hypertensive meds 





DM


-With hyperglycemia


-BG on admission 201


-POC BG monitoring


-SSI coverage prn


-HgbA1C pending





GI and DVT PPX


-On heparin and Pepcid





























Advance Directives: No


VTE prophylaxis?: Chemical, Mechanical


Plan of care discussed with patient/family: Yes

## 2021-06-10 NOTE — CONSULTATION
History of Present Illness





- Reason for Consult


Consult date: 06/10/21


acute renal failure





- History of Present Illness


The patient is a 64 YO AAF with history of Hypertension, DM-2, HLD, PAD< chronic

back pain and former smoker (quit 6 months ago) who presented to [facility] ED with 

complaints of cough and shortness of breath.  Patient complains of nonproductive

cough and sob for the past week.  Per patient sob progressively got worse which 

prompted her to come to the ED.  Patient also reports TAFOYA and orthopnea.  

Endorses receiving both doses of COVID-19 vaccine.  Patient denies cp, LE edema,

PND, N, V, D, abd pain, dysuria, hematuria, dizziness, syncope, rahs, fever, 

chills, loss of smell, loss of taste, recent travel, or recent sick contacts.  

CXR showed PNA.  Labs significant for Creat 1.6 and BUN 27.





Past History


Past Medical History: diabetes, hypertension, hyperlipidemia, other (chronic 

back pain)


Past Surgical History: No surgical history


Social history: , lives with family, full code, other (former smoker quit

6 months ago).  denies: alcohol abuse, prescription drug abuse, IV drug use


Family history: hypertension





Medications and Allergies


                                    Allergies











Allergy/AdvReac Type Severity Reaction Status Date / Time


 


No Known Allergies Allergy   Verified 02/12/14 15:34











                                Home Medications











 Medication  Instructions  Recorded  Confirmed  Last Taken  Type


 


lisinopriL [Zestril TAB] 20 mg PO QDAY #30 tablet 02/14/14 06/10/21 06/09/21 Rx


 


Bisoprolol/Hctz (Nf) [[facility] 5/6.25 1 tab PO QDAY 08/30/16 06/10/21 06/09/21 

History





(Nf)]     


 


Ibuprofen [Motrin 800 MG tab] 800 mg PO TID 08/30/16 06/10/21 11/06/16 History





    400 MG 


 


Multivit-Min/FA/Lycopen/Lutein 1 each PO QDAY 08/30/16 06/10/21 06/08/21 History





[Centrum Silver Tablet]     


 


Nesina 25 mg PO DAILY 09/02/16 06/10/21 11/07/16 History


 


HYDROcodone/APAP 5-325 [[place] 1 - 2 each PO Q4HR PRN #30 tablet 11/08/16 

06/10/21 Unknown Rx





5-325 mg TAB]     


 


Aspirin EC [Ecotrin] 325 mg PO QDAY #30 tablet 07/05/20 06/10/21 06/08/21 Rx


 


NIFEdipine XL [Procardia Xl] 30 mg PO Q12HR #60 tablet 07/05/20 06/10/21 

06/09/21 Rx


 


Pravastatin [Pravachol] 80 mg PO QHS #30 tablet 07/05/20 06/10/21 06/09/21 Rx


 


hydrALAZINE [Apresoline TAB] 75 mg PO Q8HR #90 tablet 07/05/20 06/10/21 06/08/21

Rx











Active Meds: 


Active Medications





Acetaminophen (Acetaminophen 325 Mg Tab)  650 mg PO Q4H PRN


   PRN Reason: Pain MILD(1-3)/Fever >100.5/HA


Albuterol (Albuterol 2.5 Mg/3 Ml Nebu)  2.5 mg IH Q3HRT PRN


   PRN Reason: Shortness Of Breath


Aspirin (Aspirin Ec 325 Mg Tab)  325 mg PO QDAY [facility]


Dextrose (Dextrose 50% In Water (25gm) 50 Ml Syringe)  50 ml IV Q30MIN PRN; 

Protocol


   PRN Reason: Hypoglycemia


Docusate Sodium (Docusate Sodium 100 Mg Cap)  100 mg PO BID [facility]


Famotidine (Famotidine 10 Mg Tab)  10 mg PO BID [facility]


Furosemide (Furosemide 40 Mg/4 Ml Inj)  40 mg IV BID@0600,1800 [facility]


   Last Admin: 06/10/21 06:09 Dose:  40 mg


   Documented by: 


Guaifenesin (Guaifenesin Er 600 Mg Tab)  600 mg PO BID [facility]


   Last Admin: 06/10/21 01:35 Dose:  600 mg


   Documented by: 


Heparin Sodium (Porcine) (Heparin 5,000 Unit/1 Ml Vial)  5,000 unit SUB-Q BID 

[facility]


Hydralazine HCl (Hydralazine 25 Mg Tab)  75 mg PO Q8HR [facility]


   Last Admin: 06/10/21 06:09 Dose:  75 mg


   Documented by: 


Ceftriaxone Sodium (Rocephin/Ns 1 Gm/50 Ml)  1 gm in 50 mls @ 100 mls/hr IV 

Q24HR [facility]; Protocol


Insulin Human Lispro (Insulin Lispro 100 Unit/Ml)  0 unit SUB-Q ACHS [facility]; 

Protocol


Lisinopril (Lisinopril 20 Mg Tab)  20 mg PO QDAY [facility]


Metoprolol Tartrate (Metoprolol Tartrate 25 Mg Tab)  12.5 mg PO BID [facility]


Nifedipine (Nifedipine Xl 30 Mg Tab)  30 mg PO Q12HR [facility]


Nitroglycerin (Nitroglycerin 0.4 Mg Tab Subl)  0.4 mg SL .Q5MIN PRN


   PRN Reason: Chest Pain


Ondansetron HCl (Ondansetron 4 Mg/2 Ml Inj)  4 mg IV Q6H PRN


   PRN Reason: Nausea And Vomiting


Pravastatin Sodium (Pravastatin 80 Mg Tab)  80 mg PO QHS [facility]


Sodium Chloride (Sodium Chloride 0.9% 10 Ml Flush Syringe)  10 ml IV BID [facility]


Sodium Chloride (Sodium Chloride 0.9% 10 Ml Flush Syringe)  10 ml IV PRN PRN


   PRN Reason: LINE FLUSH











Review of Systems


Constitutional: fatigue, no weight loss, no weight gain, no fever, no chills, no

anorexia, no weakness


Breasts: deferred


Cardiovascular: orthopnea, shortness of breath, dyspnea on exertion, high blood 

pressure, no chest pain, no palpitations, no edema, no syncope, no 

lightheadedness, no leg edema


Respiratory: cough, shortness of breath, dyspnea on exertion, no hemoptysis


Gastrointestinal: no abdominal pain, no nausea, no vomiting, no diarrhea, no 

melena


Genitourinary Female: no dysuria, no hematuria


Musculoskeletal: no muscle weakness


Integumentary: no rash


Neurological: no seizures, no syncope, no convulsions, no aphasia, no change in 

speech, no change in mentation, no confusion





Exam





- Vital Signs


Vital signs: 


                                   Vital Signs











Pulse Pulse Ox


 


 106 H  98 


 


 06/09/21 13:32  06/09/21 13:32














Results





- Lab Results





                                 06/09/21 14:27





                                 06/10/21 04:17


                             Most recent lab results











Calcium  9.1 mg/dL (8.4-10.2)   06/09/21  14:27    














Assessment and Plan


1. Acute kidney injury:


JENNIFER in the setting of PNA and ?CHF.


?ATN.


Urine studies and Renal US ordered.


Monitor renal function.


Avoid nephrotoxic agents.


Meds dosage based on GFR.





2. FEN:


Anion-gap metabolic acidosis, monitor.


Monitor lytes and volume status.





3. Pneumonia:


IV antibiotics.


Follow culture result.





4. CHF // Elevated troponin:


IV lasix.


Followed by Cards.





5. DM type 2.





6. Hypertension:


BP controlled.











Subjective:


Patient was seen and examined at the bedside.











Examination:


General appearance: well-developed, appears stated age, not in distress, NC O2


HEENT: atraumatic, JENA


Neck: trachea midline


Respiratory: ctab


Heart: S1S2, regular, no murmur


Abdomen: soft, bowel sounds heard, NT


Integumentary: no obvious rash


Neurologic: AO, non-focal


Ext: no edema

## 2021-06-10 NOTE — PROGRESS NOTE
Assessment and Plan


Assessment and plan: 





Pneumonia


-CXR shows Interval development of confluent infiltrate in the right lung base 

likely represents an infectious process


-Blood Cultures pending


-Monitor CBC


-Start on IV Abx


-Supportive care





Acute CHF (new onset)


-BNP elevated at 9110


-c/o of progressively worsening shortness of breath, denies bilateral lower 

extremity edema, or peripheral edema


-Troponin elevated x2, now 0.030 (trending up from 0.027), will continue to 

trend


-??Troponin leak


-CXR shows Interval development of small bilateral pleural effusions with 

associated compressive atelectasis


-Continue ASA and ACE, will start on BB


-Start IV Lasix twice a day


-Echo pending


-Cardiology consulted





Elevated troponin


-Troponin elevated x2, now 0.030 (trending up from 0.027), will continue to 

trend


-??Troponin leak


-Echo pending


-Cardiology consulted





Dyspnea


-Monitor saturations


-Albuterol as needed


-Supplemental oxygen as needed





HTN


-Monitor BP


-Resume home hypertensive meds 





DM


-With hyperglycemia


-BG on admission 201


-POC BG monitoring


-SSI coverage prn


-HgbA1C pending





GI and DVT PPX


-On heparin and Pepcid





History


Interval history: 





No new issues overnight.





Hospitalist Physical





- Constitutional


Vitals: 


                                        











Temp Pulse Resp BP Pulse Ox


 


 98.7 F   101 H  18   141/88   96 


 


 06/10/21 07:20  06/10/21 07:20  06/10/21 07:20  06/10/21 07:20  06/10/21 07:20











General appearance: Present: no acute distress, well-nourished





- EENT


Eyes: Present: PERRL, EOM intact


ENT: hearing intact, clear oral mucosa, dentition normal





- Neck


Neck: Present: supple, normal ROM





- Respiratory


Respiratory effort: normal


Respiratory: bilateral: CTA





- Cardiovascular


Rhythm: regular


Heart Sounds: Present: S1 & S2.  Absent: gallop, rub





- Extremities


Extremities: no ischemia, No edema, Full ROM





- Abdominal


General gastrointestinal: soft, non-tender, non-distended, normal bowel sounds





- Integumentary


Integumentary: Present: clear, warm, dry





- Neurologic


Neurologic: CNII-XII intact, moves all extremities





HEART Score





- HEART Score


EKG: Non-specific


Age: 45-65


Risk factors: > 3 risk factors or hx of atherosclerotic disease


Troponin: 


                                        











Troponin T  0.036 ng/mL (0.00-0.029)  H  06/10/21  04:17    














Results





- Labs


CBC & Chem 7: 


                                 06/09/21 14:27





                                 06/09/21 14:27


Labs: 


                             Laboratory Last Values











WBC  8.5 K/mm3 (4.5-11.0)   06/09/21  14:27    


 


RBC  3.69 M/mm3 (3.65-5.03)   06/09/21  14:27    


 


Hgb  11.4 gm/dl (10.1-14.3)   06/09/21  14:27    


 


Hct  34.9 % (30.3-42.9)   06/09/21  14:27    


 


MCV  95 fl (79-97)   06/09/21  14:27    


 


MCH  31 pg (28-32)   06/09/21  14:27    


 


MCHC  33 % (30-34)   06/09/21  14:27    


 


RDW  13.8 % (13.2-15.2)   06/09/21  14:27    


 


Plt Count  412 K/mm3 (140-440)   06/09/21  14:27    


 


Lymph % (Auto)  14.7 % (13.4-35.0)   06/09/21  14:27    


 


Mono % (Auto)  8.6 % (0.0-7.3)  H  06/09/21  14:27    


 


Eos % (Auto)  1.1 % (0.0-4.3)   06/09/21  14:27    


 


Baso % (Auto)  0.9 % (0.0-1.8)   06/09/21  14:27    


 


Lymph # (Auto)  1.3 K/mm3 (1.2-5.4)   06/09/21  14:27    


 


Mono # (Auto)  0.7 K/mm3 (0.0-0.8)   06/09/21  14:27    


 


Eos # (Auto)  0.1 K/mm3 (0.0-0.4)   06/09/21  14:27    


 


Baso # (Auto)  0.1 K/mm3 (0.0-0.1)   06/09/21  14:27    


 


Seg Neutrophils %  74.7 % (40.0-70.0)  H  06/09/21  14:27    


 


Seg Neutrophils #  6.4 K/mm3 (1.8-7.7)   06/09/21  14:27    


 


Sodium  136 mmol/L (137-145)  L  06/09/21  14:27    


 


Potassium  4.7 mmol/L (3.6-5.0)   06/09/21  14:27    


 


Chloride  100.4 mmol/L ()   06/09/21  14:27    


 


Carbon Dioxide  19 mmol/L (22-30)  L  06/09/21  14:27    


 


Anion Gap  21 mmol/L  06/09/21  14:27    


 


BUN  27 mg/dL (7-17)  H  06/09/21  14:27    


 


Creatinine  1.6 mg/dL (0.6-1.2)  H  06/09/21  14:27    


 


Estimated GFR  39 ml/min  06/09/21  14:27    


 


BUN/Creatinine Ratio  17 %  06/09/21  14:27    


 


Glucose  201 mg/dL ()  H  06/09/21  14:27    


 


POC Glucose  215 mg/dL ()  H  06/10/21  07:25    


 


Calcium  9.1 mg/dL (8.4-10.2)   06/09/21  14:27    


 


Total Bilirubin  0.30 mg/dL (0.1-1.2)   06/09/21  14:27    


 


AST  22 units/L (5-40)   06/09/21  14:27    


 


ALT  29 units/L (7-56)   06/09/21  14:27    


 


Alkaline Phosphatase  73 units/L ()   06/09/21  14:27    


 


Troponin T  0.036 ng/mL (0.00-0.029)  H  06/10/21  04:17    


 


NT-Pro-B Natriuret Pep  9110 pg/mL (0-900)  H  06/09/21  14:27    


 


Total Protein  7.2 g/dL (6.3-8.2)   06/09/21  14:27    


 


Albumin  3.9 g/dL (3.9-5)   06/09/21  14:27    


 


Albumin/Globulin Ratio  1.2 %  06/09/21  14:27    


 


Triglycerides  92 mg/dL (2-149)   06/09/21  21:37    


 


Cholesterol  142 mg/dL ()   06/09/21  21:37    


 


LDL Cholesterol Direct  91 mg/dL ()   06/09/21  21:37    


 


HDL Cholesterol  53 mg/dL (40-59)   06/09/21  21:37    


 


Cholesterol/HDL Ratio  2.67 %  06/09/21  21:37    











Microbiology: 


Microbiology





06/10/21 00:09   Peripheral/Venous   Blood Culture - Preliminary


                            Culture in Progress


06/10/21 00:16   Peripheral/Venous   Blood Culture - Preliminary


                            Culture in Progress








Ayon/IV: 


                                        





Voiding Method                   Toilet











Active Medications





- Current Medications


Current Medications: 














Generic Name Dose Route Start Last Admin





  Trade Name Freq  PRN Reason Stop Dose Admin


 


Acetaminophen  650 mg  06/09/21 23:40 





  Acetaminophen 325 Mg Tab  PO  





  Q4H PRN  





  Pain MILD(1-3)/Fever >100.5/HA  


 


Albuterol  2.5 mg  06/09/21 23:40 





  Albuterol 2.5 Mg/3 Ml Nebu  IH  





  Q3HRT PRN  





  Shortness Of Breath  


 


Aspirin  325 mg  06/10/21 10:00 





  Aspirin Ec 325 Mg Tab  PO  





  QDAY KINJAL  


 


Dextrose  50 ml  06/09/21 23:40 





  Dextrose 50% In Water (25gm) 50 Ml Syringe  IV  





  Q30MIN PRN  





  Hypoglycemia  





  Protocol  


 


Docusate Sodium  100 mg  06/10/21 10:00 





  Docusate Sodium 100 Mg Cap  PO  





  BID KINJAL  


 


Famotidine  10 mg  06/10/21 10:00 





  Famotidine 10 Mg Tab  PO  





  BID KINJAL  


 


Furosemide  40 mg  06/10/21 06:00  06/10/21 06:09





  Furosemide 40 Mg/4 Ml Inj  IV   40 mg





  BID@0600,1800 KINJAL   Administration


 


Guaifenesin  600 mg  06/10/21 01:00  06/10/21 01:35





  Guaifenesin Er 600 Mg Tab  PO   600 mg





  BID KINJAL   Administration


 


Heparin Sodium (Porcine)  5,000 unit  06/10/21 10:00 





  Heparin 5,000 Unit/1 Ml Vial  SUB-Q  





  BID [facility]  


 


Hydralazine HCl  75 mg  06/10/21 06:00  06/10/21 06:09





  Hydralazine 25 Mg Tab  PO   75 mg





  Q8HR KINJAL   Administration


 


Ceftriaxone Sodium  1 gm in 50 mls @ 100 mls/hr  06/10/21 10:00 





  Rocephin/Ns 1 Gm/50 Ml  IV  





  Q24HR [facility]  





  Protocol  


 


Insulin Human Lispro  0 unit  06/10/21 07:30 





  Insulin Lispro 100 Unit/Ml  SUB-Q  





  [facility]  





  Protocol  


 


Lisinopril  20 mg  06/10/21 10:00 





  Lisinopril 20 Mg Tab  PO  





  QDAY KINJAL  


 


Metoprolol Tartrate  12.5 mg  06/10/21 11:00 





  Metoprolol Tartrate 25 Mg Tab  PO  





  BID [facility]  


 


Nifedipine  30 mg  06/10/21 10:00 





  Nifedipine Xl 30 Mg Tab  PO  





  Q12HR [facility]  


 


Nitroglycerin  0.4 mg  06/09/21 23:42 





  Nitroglycerin 0.4 Mg Tab Subl  SL  





  .Q5MIN PRN  





  Chest Pain  


 


Ondansetron HCl  4 mg  06/09/21 23:40 





  Ondansetron 4 Mg/2 Ml Inj  IV  





  Q6H PRN  





  Nausea And Vomiting  


 


Pravastatin Sodium  80 mg  06/10/21 22:00 





  Pravastatin 80 Mg Tab  PO  





  [facility]  


 


Sodium Chloride  10 ml  06/10/21 10:00 





  Sodium Chloride 0.9% 10 Ml Flush Syringe  IV  





  BID [facility]  


 


Sodium Chloride  10 ml  06/09/21 23:40 





  Sodium Chloride 0.9% 10 Ml Flush Syringe  IV  





  PRN PRN  





  LINE FLUSH

## 2021-06-10 NOTE — CONSULTATION
History of Present Illness


Consult date: 06/10/21


Requesting physician: PANCHO HARPER


Consult reason: congestive heart failure, elevated troponin


History of present illness: 


Pt is a 65-year-old AA female with a hx of HTN, HLD, and DM2, who presented with

complaints of SOB and dry cough x 7-10 days. Pt reports she had a sinus 

infection or cold and felt very congested at home. She also reports TAFOYA. Denies 

orthopnea, PND, and edema. No additional cardiac complaints. BNP significantly 

elevated upon arrival. CXR reveals R lung base infiltrate and small bilateral 

pleural effusions with associated compressive atelectasis. Of note, pt does 

report a hx of prior tobacco use but states she quit smoking approximately 5-6 

months ago. 





Echo 7/2020 - EF 55-60%, mild concentric LVH, trace MR, trace AR, trace TR. 





Past History


Past Medical History: diabetes, hypertension, hyperlipidemia, other (chronic ba

ck pain)


Past Surgical History: denies: valve replacement, CABG, PTCA


Social history: , lives with family, smoking (former, quit 6 months ago).

 denies: alcohol abuse


Family history: diabetes, hypertension





Medications and Allergies


                                    Allergies











Allergy/AdvReac Type Severity Reaction Status Date / Time


 


No Known Allergies Allergy   Verified 02/12/14 15:34











                                Home Medications











 Medication  Instructions  Recorded  Confirmed  Last Taken  Type


 


lisinopriL [Zestril TAB] 20 mg PO QDAY #30 tablet 02/14/14 06/10/21 06/09/21 Rx


 


Bisoprolol/Hctz (Nf) [Ziac 5/6.25 1 tab PO QDAY 08/30/16 06/10/21 06/09/21 

History





(Nf)]     


 


Ibuprofen [Motrin 800 MG tab] 800 mg PO TID 08/30/16 06/10/21 11/06/16 History





    400 MG 


 


Multivit-Min/FA/Lycopen/Lutein 1 each PO QDAY 08/30/16 06/10/21 06/08/21 History





[Centrum Silver Tablet]     


 


Nesina 25 mg PO DAILY 09/02/16 06/10/21 11/07/16 History


 


HYDROcodone/APAP 5-325 [[place] 1 - 2 each PO Q4HR PRN #30 tablet 11/08/16 

06/10/21 Unknown Rx





5-325 mg TAB]     


 


Aspirin EC [Ecotrin] 325 mg PO QDAY #30 tablet 07/05/20 06/10/21 06/08/21 Rx


 


NIFEdipine XL [Procardia Xl] 30 mg PO Q12HR #60 tablet 07/05/20 06/10/21 

06/09/21 Rx


 


Pravastatin [Pravachol] 80 mg PO QHS #30 tablet 07/05/20 06/10/21 06/09/21 Rx


 


hydrALAZINE [Apresoline TAB] 75 mg PO Q8HR #90 tablet 07/05/20 06/10/21 06/08/21

Rx











Active Meds: 


Active Medications





Acetaminophen (Acetaminophen 325 Mg Tab)  650 mg PO Q4H PRN


   PRN Reason: Pain MILD(1-3)/Fever >100.5/HA


Albuterol (Albuterol 2.5 Mg/3 Ml Nebu)  2.5 mg IH Q3HRT PRN


   PRN Reason: Shortness Of Breath


Aspirin (Aspirin Ec 325 Mg Tab)  325 mg PO QDAY [facility]


Dextrose (Dextrose 50% In Water (25gm) 50 Ml Syringe)  50 ml IV Q30MIN PRN; 

Protocol


   PRN Reason: Hypoglycemia


Docusate Sodium (Docusate Sodium 100 Mg Cap)  100 mg PO BID [facility]


Famotidine (Famotidine 10 Mg Tab)  10 mg PO BID [facility]


Furosemide (Furosemide 40 Mg/4 Ml Inj)  40 mg IV BID@0600,1800 [facility]


   Last Admin: 06/10/21 06:09 Dose:  40 mg


   Documented by: 


Guaifenesin (Guaifenesin Er 600 Mg Tab)  600 mg PO BID [facility]


   Last Admin: 06/10/21 01:35 Dose:  600 mg


   Documented by: 


Heparin Sodium (Porcine) (Heparin 5,000 Unit/1 Ml Vial)  5,000 unit SUB-Q BID 

[facility]


Hydralazine HCl (Hydralazine 25 Mg Tab)  75 mg PO Q8HR [facility]


   Last Admin: 06/10/21 06:09 Dose:  75 mg


   Documented by: 


Ceftriaxone Sodium (Rocephin/Ns 1 Gm/50 Ml)  1 gm in 50 mls @ 100 mls/hr IV 

Q24HR [facility]; Protocol


Insulin Human Lispro (Insulin Lispro 100 Unit/Ml)  0 unit SUB-Q [facility]; 

Protocol


Lisinopril (Lisinopril 20 Mg Tab)  20 mg PO QDAY [facility]


Nifedipine (Nifedipine Xl 30 Mg Tab)  30 mg PO Q12HR [facility]


Nitroglycerin (Nitroglycerin 0.4 Mg Tab Subl)  0.4 mg SL .Q5MIN PRN


   PRN Reason: Chest Pain


Ondansetron HCl (Ondansetron 4 Mg/2 Ml Inj)  4 mg IV Q6H PRN


   PRN Reason: Nausea And Vomiting


Pravastatin Sodium (Pravastatin 80 Mg Tab)  80 mg PO QHS KINJAL


Sodium Chloride (Sodium Chloride 0.9% 10 Ml Flush Syringe)  10 ml IV BID KINJAL


Sodium Chloride (Sodium Chloride 0.9% 10 Ml Flush Syringe)  10 ml IV PRN PRN


   PRN Reason: LINE FLUSH











Review of Systems


Constitutional: fatigue, no fever, no chills, no sweats


Ears, nose, mouth and throat: nasal congestion, sinus pressure


Cardiovascular: shortness of breath, dyspnea on exertion, no chest pain, no 

orthopnea, no palpitations, no edema, no syncope, no lightheadedness, no 

paroxysmal nocturnal dyspnea, no claudication


Respiratory: cough, shortness of breath, dyspnea on exertion, congestion


Gastrointestinal: no abdominal pain, no nausea, no vomiting, no diarrhea, no 

constipation


Genitourinary Female: no pelvic pain, no flank pain, no dysuria


Musculoskeletal: no neck stiffness, no neck pain, no myalgias


Integumentary: no rash, no wounds


Neurological: no head injury, no paralysis, no weakness, no parathesias, no 

numbness, no tingling, no seizures, no syncope, no vertigo, no headaches


Endocrine: no cold intolerance, no heat intolerance


Hematologic/Lymphatic: no easy bruising, no easy bleeding


Allergic/Immunologic: no anaphylaxis





Physical Examination


Last Vital Signs











Temp  98.7 F   06/10/21 07:20


 


Pulse  101 H  06/10/21 10:55


 


Resp  18   06/10/21 07:20


 


BP  141/88   06/10/21 07:20


 


Pulse Ox  96   06/10/21 07:20








General appearance: no acute distress


HEENT: Positive: EOMI, Normocephaly, Mucus Membranes Moist


Neck: Positive: neck supple, trachea midline.  Negative: JVD/HJR


Cardiac: Positive: Reg Rate and Rhythm, S1/S2.  Negative: Audible Murmur


Lungs: Positive: Rales (bibasilar)


Neuro: Positive: Grossly Intact


Abdomen: Positive: Soft.  Negative: Tender


Skin: Negative: Rash


Musculoskeletal: No Pain, Normal Range of Motion


Extremities: Present: lower extr. pulses.  Absent: edema





Results





                                 06/09/21 14:27





                                 06/11/21 05:15


                                 Cardiac Enzymes











  06/09/21 Range/Units





  14:27 


 


AST  22  (5-40)  units/L








                                     Lipids











  06/09/21 Range/Units





  21:37 


 


Triglycerides  92  (2-149)  mg/dL


 


Cholesterol  142  ()  mg/dL


 


HDL Cholesterol  53  (40-59)  mg/dL


 


Cholesterol/HDL Ratio  2.67  %








                                       CBC











  06/09/21 Range/Units





  14:27 


 


WBC  8.5  (4.5-11.0)  K/mm3


 


RBC  3.69  (3.65-5.03)  M/mm3


 


Hgb  11.4  (10.1-14.3)  gm/dl


 


Hct  34.9  (30.3-42.9)  %


 


Plt Count  412  (140-440)  K/mm3


 


Lymph # (Auto)  1.3  (1.2-5.4)  K/mm3


 


Mono # (Auto)  0.7  (0.0-0.8)  K/mm3


 


Eos # (Auto)  0.1  (0.0-0.4)  K/mm3


 


Baso # (Auto)  0.1  (0.0-0.1)  K/mm3








                          Comprehensive Metabolic Panel











  06/09/21 Range/Units





  14:27 


 


Sodium  136 L  (137-145)  mmol/L


 


Potassium  4.7  (3.6-5.0)  mmol/L


 


Chloride  100.4  ()  mmol/L


 


Carbon Dioxide  19 L  (22-30)  mmol/L


 


BUN  27 H  (7-17)  mg/dL


 


Creatinine  1.6 H  (0.6-1.2)  mg/dL


 


Glucose  201 H  ()  mg/dL


 


Calcium  9.1  (8.4-10.2)  mg/dL


 


AST  22  (5-40)  units/L


 


ALT  29  (7-56)  units/L


 


Alkaline Phosphatase  73  ()  units/L


 


Total Protein  7.2  (6.3-8.2)  g/dL


 


Albumin  3.9  (3.9-5)  g/dL














- Imaging and Cardiology


Echo: pending, other (7/2020 - EF 55-60%, mild concentric LVH, trace MR, trace 

AR, trace TR)


EKG: report reviewed, image reviewed





- EKG Interpretation


EKG: no acute changes





EKG interpretations





- Telemetry


EKG Rhythm: Sinus Tachycardia





- EKG


Sinus rhythms and dysrhythmias: sinus rhythm


Chamber hypertrophy or enlargement: left ventricular hypertro


Repolarization changes or abnormalities: nonspecific abnormality, ST segment, 

and/or T wave





Assessment and Plan


Echo pending. 


Continue IV Lasix BID with strict I/Os. 


Closely monitor renal indices and electrolytes. 


Will optimize antihypertensive regimen. 


Minimal CE elevation noted in the setting of suspected acute HF and JENNIFER. Pt 

denies chest pain. No acute ischemic changes on ECG. 


Will consider ischemic eval when clinically stable. 





Pt seen in conjunction with Dr. TONI Brower, who agrees with the assessment and plan

of care. 





- Patient Problems


(1) Pneumonia


Current Visit: Yes   Status: Acute   


Qualifiers: 


   Pneumonia type: due to unspecified organism   Laterality: right 





(2) Acute heart failure


Current Visit: Yes   Status: Acute   





(3) JENNIFER (acute kidney injury)


Current Visit: Yes   Status: Acute   





(4) Elevated troponin


Current Visit: Yes   Status: Acute   





(5) HTN (hypertension)


Current Visit: Yes   Status: Acute   


Qualifiers: 


   Hypertension type: essential hypertension   Qualified Code(s): I10 - 

Essential (primary) hypertension   





(6) HLD (hyperlipidemia)


Current Visit: Yes   Status: Chronic   


Qualifiers: 


   Hyperlipidemia type: mixed hyperlipidemia   Qualified Code(s): E78.2 - Mixed 

hyperlipidemia   





(7) DM2 (diabetes mellitus, type 2)


Current Visit: Yes   Status: Chronic   





(8) Carotid artery stenosis


Current Visit: Yes   Status: Chronic   





(9) Chronic back pain


Current Visit: Yes   Status: Chronic

## 2021-06-10 NOTE — ULTRASOUND REPORT
ULTRASOUND RENAL



INDICATION / CLINICAL INFORMATION: Acute renal failure.



COMPARISON: CT abdomen/pelvis without contrast 8/5/2016.



FINDINGS:

RIGHT KIDNEY: Length = 9.3 cm. 

- Echogenicity: Increased.

- Cortical Thickness: Normal.

- Hydronephrosis: None.

- Cyst / Mass: Multiple simple appearing cysts, the largest measuring 1.2 cm within the upper pole.  


- Stones: None seen. 



LEFT KIDNEY: Length = 8.7 cm. 

- Echogenicity: Increased.

- Cortical Thickness: Normal.

- Hydronephrosis: None.

- Cyst / Mass: Multiple simple appearing cysts, the largest measuring 2.3 cm within the upper pole.  


- Stones: None seen. 



URINARY BLADDER: Partially collapsed.

FREE FLUID: None.



ADDITIONAL FINDINGS: None.



IMPRESSION:

1. Mildly increased bilateral cortical echogenicity, ultimately nonspecific but can be seen with medi
louie renal disease.

2. Bilateral simple appearing renal cysts, as above.



Scribed by: Jennifer Burris RDMS, RVT 

Scribed: 6/10/2021 4:01 PM



Signer Name: Moiz Richardson MD 

Signed: 6/10/2021 5:21 PM

Workstation Name: [id number]

## 2021-06-11 LAB
BILIRUB UR QL STRIP: (no result)
BLOOD UR QL VISUAL: (no result)
BUN SERPL-MCNC: 38 MG/DL (ref 7–17)
BUN/CREAT SERPL: 19 %
CALCIUM SERPL-MCNC: 9 MG/DL (ref 8.4–10.2)
CREATININE,URINE: 49.1 MG/DL (ref 0.1–20)
HEMOLYSIS INDEX: 4
MUCOUS THREADS #/AREA URNS HPF: (no result) /HPF
PH UR STRIP: 6 [PH] (ref 5–7)
PROT UR STRIP-MCNC: (no result) MG/DL
RBC #/AREA URNS HPF: 1 /HPF (ref 0–6)
UROBILINOGEN UR-MCNC: < 2 MG/DL (ref ?–2)
WBC #/AREA URNS HPF: < 1 /HPF (ref 0–6)

## 2021-06-11 RX ADMIN — NIFEDIPINE SCH MG: 30 TABLET, FILM COATED, EXTENDED RELEASE ORAL at 21:20

## 2021-06-11 RX ADMIN — FUROSEMIDE SCH MG: 10 INJECTION, SOLUTION INTRAVENOUS at 05:50

## 2021-06-11 RX ADMIN — FAMOTIDINE SCH MG: 10 TABLET ORAL at 21:20

## 2021-06-11 RX ADMIN — METOPROLOL TARTRATE SCH MG: 25 TABLET, FILM COATED ORAL at 09:15

## 2021-06-11 RX ADMIN — PRAVASTATIN SODIUM SCH MG: 80 TABLET ORAL at 21:20

## 2021-06-11 RX ADMIN — LISINOPRIL SCH MG: 20 TABLET ORAL at 09:15

## 2021-06-11 RX ADMIN — METOPROLOL TARTRATE SCH MG: 25 TABLET, FILM COATED ORAL at 21:20

## 2021-06-11 RX ADMIN — ASPIRIN SCH MG: 325 TABLET, COATED ORAL at 09:14

## 2021-06-11 RX ADMIN — CEFTRIAXONE SODIUM SCH MLS/HR: 1 INJECTION, POWDER, FOR SOLUTION INTRAMUSCULAR; INTRAVENOUS at 09:16

## 2021-06-11 RX ADMIN — HEPARIN SODIUM SCH UNIT: 5000 INJECTION, SOLUTION INTRAVENOUS; SUBCUTANEOUS at 09:15

## 2021-06-11 RX ADMIN — Medication SCH ML: at 09:16

## 2021-06-11 RX ADMIN — DOCUSATE SODIUM SCH MG: 100 CAPSULE, LIQUID FILLED ORAL at 09:14

## 2021-06-11 RX ADMIN — NIFEDIPINE SCH MG: 30 TABLET, FILM COATED, EXTENDED RELEASE ORAL at 09:15

## 2021-06-11 RX ADMIN — DOCUSATE SODIUM SCH MG: 100 CAPSULE, LIQUID FILLED ORAL at 21:20

## 2021-06-11 RX ADMIN — INSULIN LISPRO SCH UNIT: 100 INJECTION, SOLUTION INTRAVENOUS; SUBCUTANEOUS at 16:16

## 2021-06-11 RX ADMIN — INSULIN LISPRO SCH UNIT: 100 INJECTION, SOLUTION INTRAVENOUS; SUBCUTANEOUS at 21:21

## 2021-06-11 RX ADMIN — FUROSEMIDE SCH MG: 10 INJECTION, SOLUTION INTRAVENOUS at 17:10

## 2021-06-11 RX ADMIN — Medication SCH ML: at 21:25

## 2021-06-11 RX ADMIN — INSULIN LISPRO SCH UNIT: 100 INJECTION, SOLUTION INTRAVENOUS; SUBCUTANEOUS at 12:33

## 2021-06-11 RX ADMIN — INSULIN LISPRO SCH UNIT: 100 INJECTION, SOLUTION INTRAVENOUS; SUBCUTANEOUS at 08:31

## 2021-06-11 RX ADMIN — HEPARIN SODIUM SCH UNIT: 5000 INJECTION, SOLUTION INTRAVENOUS; SUBCUTANEOUS at 21:20

## 2021-06-11 RX ADMIN — FAMOTIDINE SCH MG: 10 TABLET ORAL at 09:14

## 2021-06-11 NOTE — PROGRESS NOTE
Assessment and Plan


Echo reviewed - EF 15-20%, LV mod dilated, mild diastolic dysfxn, mild MR, mod L

pleural effusion. 


Continue IV Lasix BID with strict I/Os. Minimal UOP noted/24 hrs. May need to 

consider increasing dosage if ok from a Nephro standpoint. 


Closely monitor renal indices and electrolytes. 


Continue BB. No ACEI/ARB/ARNI for now due to renal fxn. 


Minimal CE elevation noted in the setting of acute HF and JENNIFER. Pt denies chest 

pain. No acute ischemic changes on ECG. 


Plan for ischemic eval when stable, hopefully Monday. Will ideally aim for c

ardiac catheterization; however, if renal fxn is not stable, may consider 

Lexiscan stress MPI instead. 





Pt seen in conjunction with Dr. TONI Brower, who agrees with the assessment and plan

of care. 





- Patient Problems


(1) Pleural effusion


Current Visit: Yes   Status: Acute   





(2) Acute HFrEF (heart failure with reduced ejection fraction)


Current Visit: Yes   Status: Acute   





(3) Cardiomyopathy


Current Visit: Yes   Status: Acute   





(4) JENNIFER (acute kidney injury)


Current Visit: Yes   Status: Acute   





(5) Elevated troponin


Current Visit: Yes   Status: Acute   





(6) HTN (hypertension)


Current Visit: Yes   Status: Chronic   


Qualifiers: 


   Hypertension type: essential hypertension   Qualified Code(s): I10 - 

Essential (primary) hypertension   





(7) HLD (hyperlipidemia)


Current Visit: Yes   Status: Chronic   


Qualifiers: 


   Hyperlipidemia type: mixed hyperlipidemia   Qualified Code(s): E78.2 - Mixed 

hyperlipidemia   





(8) DM2 (diabetes mellitus, type 2)


Current Visit: Yes   Status: Chronic   





(9) Carotid artery stenosis


Current Visit: Yes   Status: Chronic   





(10) Chronic back pain


Current Visit: Yes   Status: Chronic   





Subjective


Date of service: 06/11/21


Principal diagnosis: Acute HFrEF


Interval history: 


Resting comfortably in bed. Still SOB w/exertion but improving. No additional 

complaints. Tele reviewed - SR 70-80s w/PVCs, no events overnight. 





Objective


Last Vital Signs











Temp  97.9 F   06/11/21 15:33


 


Pulse  83   06/11/21 15:33


 


Resp  16   06/11/21 15:33


 


BP  105/66   06/11/21 15:33


 


Pulse Ox  97   06/11/21 15:33











- Physical Examination


General: No Apparent Distress


HEENT: Positive: EOMI, Normocephaly, Mucus Membranes Moist


Neck: Positive: neck supple, trachea midline.  Negative: JVD/HJR


Cardiac: Positive: Reg Rate and Rhythm, S1/S2.  Negative: Audible Murmur


Lungs: Positive: Decreased Breath Sounds (bases)


Neuro: Positive: Grossly Intact


Abdomen: Positive: Soft.  Negative: Tender


Skin: Negative: Rash


Musculoskeletal: No Pain, Normal Range of Motion


Extremities: Present: lower extr. pulses.  Absent: edema





- Labs and Meds


                          Comprehensive Metabolic Panel











  06/11/21 Range/Units





  05:15 


 


Sodium  136 L  (137-145)  mmol/L


 


Potassium  3.9  (3.6-5.0)  mmol/L


 


Chloride  98.4  ()  mmol/L


 


Carbon Dioxide  23  (22-30)  mmol/L


 


BUN  38 H  (7-17)  mg/dL


 


Creatinine  2.0 H  (0.6-1.2)  mg/dL


 


Glucose  138 H  ()  mg/dL


 


Calcium  9.0  (8.4-10.2)  mg/dL














- Imaging and Cardiology


EKG: report reviewed, image reviewed


Echo: report reviewed (6/10/2021 - EF 15-20%, LV mod dilated, mild diastolic 

dysfxn, mild MR, mod L pleural effusion), other (7/2020 - EF 55-60%, mild 

concentric LVH, trace MR, trace AR, trace TR)





- Telemetry


EKG Rhythm: Sinus Rhythm





- EKG


Sinus rhythms and dysrhythmias: sinus rhythm


Chamber hypertrophy or enlargement: left ventricular hypertro


Repolarization changes or abnormalities: nonspecific abnormality, ST segment, 

and/or T wave

## 2021-06-11 NOTE — PROGRESS NOTE
Assessment and Plan


Assessment and plan: 





Pneumonia


-CXR shows Interval development of confluent infiltrate in the right lung base 

likely represents an infectious process


-Blood Cultures no growth x24 hours


-Continue IV Abx


-Supportive care





Acute CHF (new onset)


-BNP elevated at 9110 on admission


-CXR shows Interval development of small bilateral pleural effusions with 

associated compressive atelectasis


-Continue ASA and ACE, will start on BB


-Continue IV Lasix per cardiology recommendation


-Echo pending


-Cardiology following





Elevated troponin


-Troponin elevated on admission


-Echo pending


-Cardiology following





Acute hypoxic respiratory failure


-Etiology secondary to CHF and pneumonia


-Albuterol as needed


-Supplemental oxygen as needed





HTN


-Monitor BP


-Continue anti-hypertensive meds 





DM


-With hyperglycemia


-BG on admission 201


-POC BG monitoring


-SSI coverage prn


-HgbA1C pending





GI and DVT PPX


-On heparin and Pepcid





History


Interval history: 





No new issues overnight.





Hospitalist Physical





- Constitutional


Vitals: 


                                        











Temp Pulse Resp BP Pulse Ox


 


 97.9 F   84   16   103/64   97 


 


 06/11/21 07:56  06/11/21 09:15  06/11/21 08:00  06/11/21 09:15  06/11/21 08:00











General appearance: Present: no acute distress





- EENT


Eyes: Present: PERRL, EOM intact


ENT: hearing intact, clear oral mucosa, dentition normal





- Neck


Neck: Present: supple, normal ROM





- Respiratory


Respiratory effort: normal


Respiratory: bilateral: CTA





- Cardiovascular


Rhythm: regular


Heart Sounds: Present: S1 & S2.  Absent: gallop, rub





- Extremities


Extremities: no ischemia, No edema, Full ROM





- Abdominal


General gastrointestinal: soft, non-tender, non-distended, normal bowel sounds





- Integumentary


Integumentary: Present: clear, warm, dry





- Neurologic


Neurologic: CNII-XII intact, moves all extremities





HEART Score





- HEART Score


EKG: Non-specific


Age: 45-65


Risk factors: > 3 risk factors or hx of atherosclerotic disease


Troponin: 


                                        











Troponin T  0.036 ng/mL (0.00-0.029)  H  06/10/21  04:17    














Results





- Labs


CBC & Chem 7: 


                                 06/09/21 14:27





                                 06/11/21 05:15


Labs: 


                             Laboratory Last Values











WBC  8.5 K/mm3 (4.5-11.0)   06/09/21  14:27    


 


RBC  3.69 M/mm3 (3.65-5.03)   06/09/21  14:27    


 


Hgb  11.4 gm/dl (10.1-14.3)   06/09/21  14:27    


 


Hct  34.9 % (30.3-42.9)   06/09/21  14:27    


 


MCV  95 fl (79-97)   06/09/21  14:27    


 


MCH  31 pg (28-32)   06/09/21  14:27    


 


MCHC  33 % (30-34)   06/09/21  14:27    


 


RDW  13.8 % (13.2-15.2)   06/09/21  14:27    


 


Plt Count  412 K/mm3 (140-440)   06/09/21  14:27    


 


Lymph % (Auto)  14.7 % (13.4-35.0)   06/09/21  14:27    


 


Mono % (Auto)  8.6 % (0.0-7.3)  H  06/09/21  14:27    


 


Eos % (Auto)  1.1 % (0.0-4.3)   06/09/21  14:27    


 


Baso % (Auto)  0.9 % (0.0-1.8)   06/09/21  14:27    


 


Lymph # (Auto)  1.3 K/mm3 (1.2-5.4)   06/09/21  14:27    


 


Mono # (Auto)  0.7 K/mm3 (0.0-0.8)   06/09/21  14:27    


 


Eos # (Auto)  0.1 K/mm3 (0.0-0.4)   06/09/21  14:27    


 


Baso # (Auto)  0.1 K/mm3 (0.0-0.1)   06/09/21  14:27    


 


Seg Neutrophils %  74.7 % (40.0-70.0)  H  06/09/21  14:27    


 


Seg Neutrophils #  6.4 K/mm3 (1.8-7.7)   06/09/21  14:27    


 


Sodium  136 mmol/L (137-145)  L  06/11/21  05:15    


 


Potassium  3.9 mmol/L (3.6-5.0)   06/11/21  05:15    


 


Chloride  98.4 mmol/L ()   06/11/21  05:15    


 


Carbon Dioxide  23 mmol/L (22-30)   06/11/21  05:15    


 


Anion Gap  19 mmol/L  06/11/21  05:15    


 


BUN  38 mg/dL (7-17)  H  06/11/21  05:15    


 


Creatinine  2.0 mg/dL (0.6-1.2)  H  06/11/21  05:15    


 


Estimated GFR  30 ml/min  06/11/21  05:15    


 


BUN/Creatinine Ratio  19 %  06/11/21  05:15    


 


Glucose  138 mg/dL ()  H  06/11/21  05:15    


 


POC Glucose  217 mg/dL ()  H  06/11/21  08:01    


 


Calcium  9.0 mg/dL (8.4-10.2)   06/11/21  05:15    


 


Total Bilirubin  0.30 mg/dL (0.1-1.2)   06/09/21  14:27    


 


AST  22 units/L (5-40)   06/09/21  14:27    


 


ALT  29 units/L (7-56)   06/09/21  14:27    


 


Alkaline Phosphatase  73 units/L ()   06/09/21  14:27    


 


Troponin T  0.036 ng/mL (0.00-0.029)  H  06/10/21  04:17    


 


NT-Pro-B Natriuret Pep  9110 pg/mL (0-900)  H  06/09/21  14:27    


 


Total Protein  7.2 g/dL (6.3-8.2)   06/09/21  14:27    


 


Albumin  3.9 g/dL (3.9-5)   06/09/21  14:27    


 


Albumin/Globulin Ratio  1.2 %  06/09/21  14:27    


 


Triglycerides  92 mg/dL (2-149)   06/09/21  21:37    


 


Cholesterol  142 mg/dL ()   06/09/21  21:37    


 


LDL Cholesterol Direct  91 mg/dL ()   06/09/21  21:37    


 


HDL Cholesterol  53 mg/dL (40-59)   06/09/21  21:37    


 


Cholesterol/HDL Ratio  2.67 %  06/09/21  21:37    


 


Urine Color  Straw  (Yellow)   06/11/21  06:00    


 


Urine Turbidity  Clear  (Clear)   06/11/21  06:00    


 


Urine pH  6.0  (5.0-7.0)   06/11/21  06:00    


 


Ur Specific Gravity  1.009  (1.003-1.030)   06/11/21  06:00    


 


Urine Protein  <15 mg/dl mg/dL (Negative)   06/11/21  06:00    


 


Urine Glucose (UA)  Neg mg/dL (Negative)   06/11/21  06:00    


 


Urine Ketones  Neg mg/dL (Negative)   06/11/21  06:00    


 


Urine Blood  Neg  (Negative)   06/11/21  06:00    


 


Urine Nitrite  Neg  (Negative)   06/11/21  06:00    


 


Urine Bilirubin  Neg  (Negative)   06/11/21  06:00    


 


Urine Urobilinogen  < 2.0 mg/dL (<2.0)   06/11/21  06:00    


 


Ur Leukocyte Esterase  Neg  (Negative)   06/11/21  06:00    


 


Urine WBC (Auto)  < 1.0 /HPF (0.0-6.0)   06/11/21  06:00    


 


Urine RBC (Auto)  1.0 /HPF (0.0-6.0)   06/11/21  06:00    


 


U Epithel Cells (Auto)  1.0 /HPF (0-13.0)   06/11/21  06:00    


 


Urine Mucus  Few /HPF  06/11/21  06:00    


 


Urine Creatinine  49.1 mg/dL (0.1-20.0)  H  06/11/21  06:00    


 


Urine Sodium  97 mmol/L  06/11/21  06:00    


 


Nasal Screen MRSA (PCR)  Negative  (Negative)   06/10/21  Unknown











Microbiology: 


Microbiology





06/10/21 00:16   Peripheral/Venous   Blood Culture - Preliminary


                            NO GROWTH AFTER 24 HOURS


06/10/21 00:09   Peripheral/Venous   Blood Culture - Preliminary


                            NO GROWTH AFTER 24 HOURS








Ayon/IV: 


                                        





Voiding Method                   Toilet











Active Medications





- Current Medications


Current Medications: 














Generic Name Dose Route Start Last Admin





  Trade Name Freq  PRN Reason Stop Dose Admin


 


Acetaminophen  650 mg  06/09/21 23:40 





  Acetaminophen 325 Mg Tab  PO  





  Q4H PRN  





  Pain MILD(1-3)/Fever >100.5/HA  


 


Albuterol  2.5 mg  06/09/21 23:40 





  Albuterol 2.5 Mg/3 Ml Nebu  IH  





  Q3HRT PRN  





  Shortness Of Breath  


 


Aspirin  325 mg  06/10/21 10:00  06/11/21 09:14





  Aspirin Ec 325 Mg Tab  PO   325 mg





  QDAY KINJAL   Administration


 


Dextrose  50 ml  06/09/21 23:40 





  Dextrose 50% In Water (25gm) 50 Ml Syringe  IV  





  Q30MIN PRN  





  Hypoglycemia  





  Protocol  


 


Docusate Sodium  100 mg  06/10/21 10:00  06/11/21 09:14





  Docusate Sodium 100 Mg Cap  PO   100 mg





  BID KINJAL   Administration


 


Famotidine  10 mg  06/10/21 10:00  06/11/21 09:14





  Famotidine 10 Mg Tab  PO   10 mg





  BID KINJAL   Administration


 


Furosemide  40 mg  06/10/21 06:00  06/11/21 05:50





  Furosemide 40 Mg/4 Ml Inj  IV   40 mg





  BID@0600,1800 KINJAL   Administration


 


Guaifenesin  600 mg  06/10/21 01:00  06/11/21 09:15





  Guaifenesin Er 600 Mg Tab  PO   600 mg





  BID KINJAL   Administration


 


Heparin Sodium (Porcine)  5,000 unit  06/10/21 10:00  06/11/21 09:15





  Heparin 5,000 Unit/1 Ml Vial  SUB-Q   5,000 unit





  BID KINJAL   Administration


 


Hydralazine HCl  75 mg  06/10/21 06:00  06/11/21 05:49





  Hydralazine 25 Mg Tab  PO   75 mg





  Q8HR KINJAL   Administration


 


Ceftriaxone Sodium  1 gm in 50 mls @ 100 mls/hr  06/10/21 10:00  06/11/21 09:16





  Rocephin/Ns 1 Gm/50 Ml  IV   100 mls/hr





  Q24HR KINJAL   Administration





  Protocol  


 


Insulin Human Lispro  0 unit  06/10/21 07:30  06/11/21 08:31





  Insulin Lispro 100 Unit/Ml  SUB-Q   3 unit





  ACHS KINJAL   Administration





  Protocol  


 


Lisinopril  20 mg  06/10/21 10:00  06/11/21 09:15





  Lisinopril 20 Mg Tab  PO   20 mg





  QDAY KINJAL   Administration


 


Metoprolol Tartrate  12.5 mg  06/10/21 11:00  06/11/21 09:15





  Metoprolol Tartrate 25 Mg Tab  PO   12.5 mg





  BID KINJAL   Administration


 


Nifedipine  30 mg  06/10/21 10:00  06/11/21 09:15





  Nifedipine Xl 30 Mg Tab  PO   30 mg





  Q12HR KINJAL   Administration


 


Nitroglycerin  0.4 mg  06/09/21 23:42 





  Nitroglycerin 0.4 Mg Tab Subl  SL  





  .Q5MIN PRN  





  Chest Pain  


 


Ondansetron HCl  4 mg  06/09/21 23:40 





  Ondansetron 4 Mg/2 Ml Inj  IV  





  Q6H PRN  





  Nausea And Vomiting  


 


Pravastatin Sodium  80 mg  06/10/21 22:00  06/10/21 22:05





  Pravastatin 80 Mg Tab  PO   80 mg





  QHS KINJAL   Administration


 


Sodium Chloride  10 ml  06/10/21 10:00  06/11/21 09:16





  Sodium Chloride 0.9% 10 Ml Flush Syringe  IV   10 ml





  BID KINJAL   Administration


 


Sodium Chloride  10 ml  06/09/21 23:40 





  Sodium Chloride 0.9% 10 Ml Flush Syringe  IV  





  PRN PRN  





  LINE FLUSH

## 2021-06-11 NOTE — PROGRESS NOTE
Assessment and Plan


1. Acute kidney injury:


JENNIFER in the setting of PNA and ?CHF.


Likely ATN.


UA bland.


Renal US negative for hydro.


Monitor renal function.


Avoid nephrotoxic agents.


Meds dosage based on GFR.





2. FEN:


Anion-gap metabolic acidosis, monitor.


Monitor lytes and volume status.





3. Pneumonia:


IV antibiotics.


Follow culture result.





4. CHF // Elevated troponin:


IV lasix.


Followed by Cards.





5. DM type 2.





6. Hypertension:


BP controlled.











Subjective:


Patient was seen and examined at the bedside.


Doing better today.











Examination:


General appearance: well-developed, appears stated age, not in distress


HEENT: atraumatic, JENA


Neck: trachea midline


Respiratory: ctab


Heart: S1S2, regular, no murmur


Abdomen: soft, bowel sounds heard, NT


Integumentary: no obvious rash


Neurologic: AO, non-focal


Ext: no edema








Subjective


Date of service: 06/11/21





Objective





- Vital Signs


Vital signs: 


                               Vital Signs - 12hr











  06/11/21 06/11/21 06/11/21





  03:19 05:49 07:00


 


Temperature 97.6 F  


 


Pulse Rate 89 84 87


 


Respiratory 18  





Rate   


 


Blood Pressure 125/75 125/75 


 


O2 Sat by Pulse 96  





Oximetry   














  06/11/21 06/11/21 06/11/21





  07:56 08:00 09:15


 


Temperature 97.9 F  


 


Pulse Rate 84  84


 


Respiratory 16 16 





Rate   


 


Blood Pressure 103/56  103/64


 


O2 Sat by Pulse 96 97 





Oximetry   














  06/11/21





  10:33


 


Temperature 


 


Pulse Rate 


 


Respiratory 





Rate 


 


Blood Pressure 


 


O2 Sat by Pulse 98





Oximetry 














- Lab





                                 06/09/21 14:27





                                 06/11/21 05:15


                             Most recent lab results











Calcium  9.0 mg/dL (8.4-10.2)   06/11/21  05:15    


 


Urine Creatinine  49.1 mg/dL (0.1-20.0)  H  06/11/21  06:00    


 


Urine Sodium  97 mmol/L  06/11/21  06:00    














Medications & Allergies





- Medications


Allergies/Adverse Reactions: 


                                    Allergies





No Known Allergies Allergy (Verified 02/12/14 15:34)


   








Home Medications: 


                                Home Medications











 Medication  Instructions  Recorded  Confirmed  Last Taken  Type


 


lisinopriL [Zestril TAB] 20 mg PO QDAY #30 tablet 02/14/14 06/10/21 06/09/21 Rx


 


Bisoprolol/Hctz (Nf) [Ziac 5/6.25 1 tab PO QDAY 08/30/16 06/10/21 06/09/21 

History





(Nf)]     


 


Ibuprofen [Motrin 800 MG tab] 800 mg PO TID 08/30/16 06/10/21 11/06/16 History





    400 MG 


 


Multivit-Min/FA/Lycopen/Lutein 1 each PO QDAY 08/30/16 06/10/21 06/08/21 History





[Centrum Silver Tablet]     


 


Nesina 25 mg PO DAILY 09/02/16 06/10/21 11/07/16 History


 


HYDROcodone/APAP 5-325 [[place] 1 - 2 each PO Q4HR PRN #30 tablet 11/08/16 

06/10/21 Unknown Rx





5-325 mg TAB]     


 


Aspirin EC [Ecotrin] 325 mg PO QDAY #30 tablet 07/05/20 06/10/21 06/08/21 Rx


 


NIFEdipine XL [Procardia Xl] 30 mg PO Q12HR #60 tablet 07/05/20 06/10/21 

06/09/21 Rx


 


Pravastatin [Pravachol] 80 mg PO QHS #30 tablet 07/05/20 06/10/21 06/09/21 Rx


 


hydrALAZINE [Apresoline TAB] 75 mg PO Q8HR #90 tablet 07/05/20 06/10/21 06/08/21

Rx











Active Medications: 














Generic Name Dose Route Start Last Admin





  Trade Name Freq  PRN Reason Stop Dose Admin


 


Acetaminophen  650 mg  06/09/21 23:40 





  Acetaminophen 325 Mg Tab  PO  





  Q4H PRN  





  Pain MILD(1-3)/Fever >100.5/HA  


 


Albuterol  2.5 mg  06/09/21 23:40 





  Albuterol 2.5 Mg/3 Ml Nebu  IH  





  Q3HRT PRN  





  Shortness Of Breath  


 


Aspirin  325 mg  06/10/21 10:00  06/11/21 09:14





  Aspirin Ec 325 Mg Tab  PO   325 mg





  QDAY KINJAL   Administration


 


Dextrose  50 ml  06/09/21 23:40 





  Dextrose 50% In Water (25gm) 50 Ml Syringe  IV  





  Q30MIN PRN  





  Hypoglycemia  





  Protocol  


 


Docusate Sodium  100 mg  06/10/21 10:00  06/11/21 09:14





  Docusate Sodium 100 Mg Cap  PO   100 mg





  BID KINJAL   Administration


 


Famotidine  10 mg  06/10/21 10:00  06/11/21 09:14





  Famotidine 10 Mg Tab  PO   10 mg





  BID KINJAL   Administration


 


Furosemide  40 mg  06/10/21 06:00  06/11/21 05:50





  Furosemide 40 Mg/4 Ml Inj  IV   40 mg





  BID@0600,1800 KINJAL   Administration


 


Guaifenesin  600 mg  06/10/21 01:00  06/11/21 09:15





  Guaifenesin Er 600 Mg Tab  PO   600 mg





  BID KINJAL   Administration


 


Heparin Sodium (Porcine)  5,000 unit  06/10/21 10:00  06/11/21 09:15





  Heparin 5,000 Unit/1 Ml Vial  SUB-Q   5,000 unit





  BID KINJAL   Administration


 


Hydralazine HCl  75 mg  06/10/21 06:00  06/11/21 05:49





  Hydralazine 25 Mg Tab  PO   75 mg





  Q8HR KINJAL   Administration


 


Ceftriaxone Sodium  1 gm in 50 mls @ 100 mls/hr  06/10/21 10:00  06/11/21 09:16





  Rocephin/Ns 1 Gm/50 Ml  IV  06/14/21 10:29  100 mls/hr





  Q24HR KINJAL   Administration





  Protocol  


 


Insulin Human Lispro  0 unit  06/10/21 07:30  06/11/21 08:31





  Insulin Lispro 100 Unit/Ml  SUB-Q   3 unit





  ACHS KINJAL   Administration





  Protocol  


 


Lisinopril  20 mg  06/10/21 10:00  06/11/21 09:15





  Lisinopril 20 Mg Tab  PO   20 mg





  QDAY KINJAL   Administration


 


Metoprolol Tartrate  12.5 mg  06/10/21 11:00  06/11/21 09:15





  Metoprolol Tartrate 25 Mg Tab  PO   12.5 mg





  BID KINJAL   Administration


 


Nifedipine  30 mg  06/10/21 10:00  06/11/21 09:15





  Nifedipine Xl 30 Mg Tab  PO   30 mg





  Q12HR KINJAL   Administration


 


Nitroglycerin  0.4 mg  06/09/21 23:42 





  Nitroglycerin 0.4 Mg Tab Subl  SL  





  .Q5MIN PRN  





  Chest Pain  


 


Ondansetron HCl  4 mg  06/09/21 23:40 





  Ondansetron 4 Mg/2 Ml Inj  IV  





  Q6H PRN  





  Nausea And Vomiting  


 


Pravastatin Sodium  80 mg  06/10/21 22:00  06/10/21 22:05





  Pravastatin 80 Mg Tab  PO   80 mg





  QHS KINJAL   Administration


 


Sodium Chloride  10 ml  06/10/21 10:00  06/11/21 09:16





  Sodium Chloride 0.9% 10 Ml Flush Syringe  IV   10 ml





  BID KINJAL   Administration


 


Sodium Chloride  10 ml  06/09/21 23:40 





  Sodium Chloride 0.9% 10 Ml Flush Syringe  IV  





  PRN PRN  





  LINE FLUSH

## 2021-06-12 LAB
BUN SERPL-MCNC: 52 MG/DL (ref 7–17)
BUN/CREAT SERPL: 23 %
CALCIUM SERPL-MCNC: 8.7 MG/DL (ref 8.4–10.2)
HEMOLYSIS INDEX: 26

## 2021-06-12 RX ADMIN — INSULIN LISPRO SCH UNIT: 100 INJECTION, SOLUTION INTRAVENOUS; SUBCUTANEOUS at 12:11

## 2021-06-12 RX ADMIN — Medication SCH ML: at 09:18

## 2021-06-12 RX ADMIN — INSULIN LISPRO SCH UNIT: 100 INJECTION, SOLUTION INTRAVENOUS; SUBCUTANEOUS at 17:49

## 2021-06-12 RX ADMIN — DOCUSATE SODIUM SCH MG: 100 CAPSULE, LIQUID FILLED ORAL at 09:17

## 2021-06-12 RX ADMIN — INSULIN LISPRO SCH UNIT: 100 INJECTION, SOLUTION INTRAVENOUS; SUBCUTANEOUS at 09:16

## 2021-06-12 RX ADMIN — Medication SCH ML: at 21:51

## 2021-06-12 RX ADMIN — METOPROLOL TARTRATE SCH MG: 25 TABLET, FILM COATED ORAL at 21:50

## 2021-06-12 RX ADMIN — FAMOTIDINE SCH MG: 10 TABLET ORAL at 09:18

## 2021-06-12 RX ADMIN — CEFTRIAXONE SODIUM SCH MLS/HR: 1 INJECTION, POWDER, FOR SOLUTION INTRAMUSCULAR; INTRAVENOUS at 09:23

## 2021-06-12 RX ADMIN — FAMOTIDINE SCH MG: 10 TABLET ORAL at 21:50

## 2021-06-12 RX ADMIN — NIFEDIPINE SCH MG: 30 TABLET, FILM COATED, EXTENDED RELEASE ORAL at 09:17

## 2021-06-12 RX ADMIN — PRAVASTATIN SODIUM SCH MG: 80 TABLET ORAL at 21:50

## 2021-06-12 RX ADMIN — FUROSEMIDE SCH MG: 10 INJECTION, SOLUTION INTRAVENOUS at 05:38

## 2021-06-12 RX ADMIN — DOCUSATE SODIUM SCH MG: 100 CAPSULE, LIQUID FILLED ORAL at 21:50

## 2021-06-12 RX ADMIN — HEPARIN SODIUM SCH UNIT: 5000 INJECTION, SOLUTION INTRAVENOUS; SUBCUTANEOUS at 21:51

## 2021-06-12 RX ADMIN — HEPARIN SODIUM SCH UNIT: 5000 INJECTION, SOLUTION INTRAVENOUS; SUBCUTANEOUS at 09:17

## 2021-06-12 RX ADMIN — INSULIN LISPRO SCH: 100 INJECTION, SOLUTION INTRAVENOUS; SUBCUTANEOUS at 21:49

## 2021-06-12 RX ADMIN — ASPIRIN SCH MG: 325 TABLET, COATED ORAL at 09:18

## 2021-06-12 RX ADMIN — NIFEDIPINE SCH MG: 30 TABLET, FILM COATED, EXTENDED RELEASE ORAL at 21:50

## 2021-06-12 RX ADMIN — METOPROLOL TARTRATE SCH MG: 25 TABLET, FILM COATED ORAL at 09:18

## 2021-06-12 NOTE — PROGRESS NOTE
Assessment and Plan


Echo reviewed - EF 15-20%, LV mod dilated, mild diastolic dysfxn, mild MR, mod L

pleural effusion. 


May transition to PO Lasix 40mg qDay in AM. 


Continue BB. No ACEI/ARB/ARNI for now due to renal fxn. 


Minimal CE elevation noted in the setting of acute HF and JENNIFER. Pt denies chest 

pain. No acute ischemic changes on ECG. 


Plan for ischemic eval Monday AM. Will ideally aim for cardiac catheterization; 

however, if renal fxn is not stable, may consider Lexiscan stress MPI instead. 





Pt seen in conjunction with Dr. Yanes, who agrees with the assessment and plan

of care. 





- Patient Problems


(1) Pleural effusion


Current Visit: Yes   Status: Acute   





(2) Acute HFrEF (heart failure with reduced ejection fraction)


Current Visit: Yes   Status: Acute   





(3) Cardiomyopathy


Current Visit: Yes   Status: Acute   





(4) JENNIFER (acute kidney injury)


Current Visit: Yes   Status: Acute   





(5) Elevated troponin


Current Visit: Yes   Status: Acute   





(6) HTN (hypertension)


Current Visit: Yes   Status: Chronic   


Qualifiers: 


   Hypertension type: essential hypertension   Qualified Code(s): I10 - Essenti

al (primary) hypertension   





(7) HLD (hyperlipidemia)


Current Visit: Yes   Status: Chronic   


Qualifiers: 


   Hyperlipidemia type: mixed hyperlipidemia   Qualified Code(s): E78.2 - Mixed 

hyperlipidemia   





(8) DM2 (diabetes mellitus, type 2)


Current Visit: Yes   Status: Chronic   





(9) Carotid artery stenosis


Current Visit: Yes   Status: Chronic   





(10) Chronic back pain


Current Visit: Yes   Status: Chronic   





Subjective


Date of service: 06/12/21


Principal diagnosis: Acute HFrEF


Interval history: 


Resting comfortably in bed. States she is feeling much better today. SOB 

improving. No new complaints. Tele reviewed - SR 70-80s, no events overnight. 





Objective


Last Vital Signs











Temp  97.8 F   06/12/21 03:54


 


Pulse  88   06/12/21 09:18


 


Resp  18   06/12/21 08:00


 


BP  123/65   06/12/21 09:18


 


Pulse Ox  95   06/12/21 09:39











- Physical Examination


General: No Apparent Distress


HEENT: Positive: EOMI, Normocephaly, Mucus Membranes Moist


Neck: Positive: neck supple, trachea midline.  Negative: JVD/HJR


Cardiac: Positive: Reg Rate and Rhythm, S1/S2


Lungs: Positive: Decreased Breath Sounds (bases)


Neuro: Positive: Grossly Intact


Abdomen: Positive: Soft.  Negative: Tender


Skin: Negative: Rash


Musculoskeletal: No Pain, Normal Range of Motion


Extremities: Present: lower extr. pulses.  Absent: edema





- Labs and Meds


                          Comprehensive Metabolic Panel











  06/12/21 Range/Units





  05:02 


 


Sodium  134 L  (137-145)  mmol/L


 


Potassium  4.1  (3.6-5.0)  mmol/L


 


Chloride  100.1  ()  mmol/L


 


Carbon Dioxide  20 L  (22-30)  mmol/L


 


BUN  52 H  (7-17)  mg/dL


 


Creatinine  2.3 H  (0.6-1.2)  mg/dL


 


Glucose  138 H  ()  mg/dL


 


Calcium  8.7  (8.4-10.2)  mg/dL














- Imaging and Cardiology


EKG: report reviewed, image reviewed


Echo: report reviewed (6/10/2021 - EF 15-20%, LV mod dilated, mild diastolic 

dysfxn, mild MR, mod L pleural effusion), other (7/2020 - EF 55-60%, mild 

concentric LVH, trace MR, trace AR, trace TR)





- Telemetry


EKG Rhythm: Sinus Rhythm





- EKG


Sinus rhythms and dysrhythmias: sinus rhythm


Chamber hypertrophy or enlargement: left ventricular hypertro


Repolarization changes or abnormalities: nonspecific abnormality, ST segment, 

and/or T wave

## 2021-06-12 NOTE — PROGRESS NOTE
Assessment and Plan


1. Acute kidney injury:


JENNIFER in the setting of PNA and ?CHF.


Likely ATN.


UA bland.


Renal US negative for hydro.


Monitor renal function.


Creatinine level increasing.


Diuretics stopped.


Avoid nephrotoxic agents.


Meds dosage based on GFR.





2. FEN:


Anion-gap metabolic acidosis, monitor.


Monitor lytes and volume status.





3. Pneumonia:


IV antibiotics.


Follow culture result.





4. CHF // Elevated troponin:


Followed by Cards.





5. DM type 2.





6. Hypertension:


BP controlled.











Subjective:


Patient was seen and examined at the bedside.


Doing better.











Examination:


General appearance: well-developed, appears stated age, not in distress


HEENT: atraumatic, JENA


Neck: trachea midline


Respiratory: ctab


Heart: S1S2, regular, no murmur


Abdomen: soft, bowel sounds heard, NT


Integumentary: no obvious rash


Neurologic: AO, non-focal


Ext: no edema








Subjective


Date of service: 06/12/21


Principal diagnosis: Acute HFrEF





Objective





- Vital Signs


Vital signs: 


                               Vital Signs - 12hr











  06/11/21 06/12/21 06/12/21





  23:22 03:54 05:38


 


Temperature 98.4 F 97.8 F 


 


Pulse Rate 76 80 80


 


Respiratory 16 18 





Rate   


 


Blood Pressure 102/65 121/71 121/71


 


O2 Sat by Pulse 97 99 





Oximetry   














  06/12/21 06/12/21 06/12/21





  08:00 09:18 09:39


 


Temperature   


 


Pulse Rate  88 


 


Respiratory 18  





Rate   


 


Blood Pressure  123/65 


 


O2 Sat by Pulse 97  95





Oximetry   














- Lab





                                 06/09/21 14:27





                                 06/12/21 05:02


                             Most recent lab results











Calcium  8.7 mg/dL (8.4-10.2)   06/12/21  05:02    


 


Urine Creatinine  49.1 mg/dL (0.1-20.0)  H  06/11/21  06:00    


 


Urine Sodium  97 mmol/L  06/11/21  06:00    














Medications & Allergies





- Medications


Allergies/Adverse Reactions: 


                                    Allergies





No Known Allergies Allergy (Verified 02/12/14 15:34)


   








Home Medications: 


                                Home Medications











 Medication  Instructions  Recorded  Confirmed  Last Taken  Type


 


lisinopriL [Zestril TAB] 20 mg PO QDAY #30 tablet 02/14/14 06/10/21 06/09/21 Rx


 


Bisoprolol/Hctz (Nf) [Ziac 5/6.25 1 tab PO QDAY 08/30/16 06/10/21 06/09/21 

History





(Nf)]     


 


Ibuprofen [Motrin 800 MG tab] 800 mg PO TID 08/30/16 06/10/21 11/06/16 History





    400 MG 


 


Multivit-Min/FA/Lycopen/Lutein 1 each PO QDAY 08/30/16 06/10/21 06/08/21 History





[Centrum Silver Tablet]     


 


Nesina 25 mg PO DAILY 09/02/16 06/10/21 11/07/16 History


 


HYDROcodone/APAP 5-325 [[place] 1 - 2 each PO Q4HR PRN #30 tablet 11/08/16 

06/10/21 Unknown Rx





5-325 mg TAB]     


 


Aspirin EC [Ecotrin] 325 mg PO QDAY #30 tablet 07/05/20 06/10/21 06/08/21 Rx


 


NIFEdipine XL [Procardia Xl] 30 mg PO Q12HR #60 tablet 07/05/20 06/10/21 

06/09/21 Rx


 


Pravastatin [Pravachol] 80 mg PO QHS #30 tablet 07/05/20 06/10/21 06/09/21 Rx


 


hydrALAZINE [Apresoline TAB] 75 mg PO Q8HR #90 tablet 07/05/20 06/10/21 06/08/21

Rx











Active Medications: 














Generic Name Dose Route Start Last Admin





  Trade Name Freq  PRN Reason Stop Dose Admin


 


Acetaminophen  650 mg  06/09/21 23:40 





  Acetaminophen 325 Mg Tab  PO  





  Q4H PRN  





  Pain MILD(1-3)/Fever >100.5/HA  


 


Albuterol  2.5 mg  06/09/21 23:40 





  Albuterol 2.5 Mg/3 Ml Nebu  IH  





  Q3HRT PRN  





  Shortness Of Breath  


 


Aspirin  325 mg  06/10/21 10:00  06/12/21 09:18





  Aspirin Ec 325 Mg Tab  PO   325 mg





  QDAY KINJAL   Administration


 


Dextrose  50 ml  06/09/21 23:40 





  Dextrose 50% In Water (25gm) 50 Ml Syringe  IV  





  Q30MIN PRN  





  Hypoglycemia  





  Protocol  


 


Docusate Sodium  100 mg  06/10/21 10:00  06/12/21 09:17





  Docusate Sodium 100 Mg Cap  PO   100 mg





  BID KINJAL   Administration


 


Famotidine  10 mg  06/10/21 10:00  06/12/21 09:18





  Famotidine 10 Mg Tab  PO   10 mg





  BID KINJAL   Administration


 


Furosemide  40 mg  06/10/21 06:00  06/12/21 05:38





  Furosemide 40 Mg/4 Ml Inj  IV   40 mg





  BID@0600,1800 KINJAL   Administration


 


Guaifenesin  600 mg  06/10/21 01:00  06/12/21 09:17





  Guaifenesin Er 600 Mg Tab  PO   600 mg





  BID KINJAL   Administration


 


Heparin Sodium (Porcine)  5,000 unit  06/10/21 10:00  06/12/21 09:17





  Heparin 5,000 Unit/1 Ml Vial  SUB-Q   5,000 unit





  BID KINJAL   Administration


 


Hydralazine HCl  75 mg  06/10/21 06:00  06/12/21 05:38





  Hydralazine 25 Mg Tab  PO   75 mg





  Q8HR KINJAL   Administration


 


Ceftriaxone Sodium  1 gm in 50 mls @ 100 mls/hr  06/10/21 10:00  06/12/21 09:23





  Rocephin/Ns 1 Gm/50 Ml  IV  06/14/21 10:29  100 mls/hr





  Q24HR KINJAL   Administration





  Protocol  


 


Insulin Human Lispro  0 unit  06/10/21 07:30  06/12/21 09:16





  Insulin Lispro 100 Unit/Ml  SUB-Q   2 unit





  ACHS KINJAL   Administration





  Protocol  


 


Metoprolol Tartrate  25 mg  06/11/21 22:00  06/12/21 09:18





  Metoprolol Tartrate 25 Mg Tab  PO   25 mg





  BID KINJAL   Administration


 


Nifedipine  30 mg  06/10/21 10:00  06/12/21 09:17





  Nifedipine Xl 30 Mg Tab  PO   30 mg





  Q12HR KINJAL   Administration


 


Nitroglycerin  0.4 mg  06/09/21 23:42 





  Nitroglycerin 0.4 Mg Tab Subl  SL  





  .Q5MIN PRN  





  Chest Pain  


 


Ondansetron HCl  4 mg  06/09/21 23:40 





  Ondansetron 4 Mg/2 Ml Inj  IV  





  Q6H PRN  





  Nausea And Vomiting  


 


Pravastatin Sodium  80 mg  06/10/21 22:00  06/11/21 21:20





  Pravastatin 80 Mg Tab  PO   80 mg





  QHS KINJAL   Administration


 


Sodium Chloride  10 ml  06/10/21 10:00  06/12/21 09:18





  Sodium Chloride 0.9% 10 Ml Flush Syringe  IV   10 ml





  BID KINJAL   Administration


 


Sodium Chloride  10 ml  06/09/21 23:40 





  Sodium Chloride 0.9% 10 Ml Flush Syringe  IV  





  PRN PRN  





  LINE FLUSH

## 2021-06-12 NOTE — PROGRESS NOTE
Assessment and Plan


Assessment and plan: 





Pneumonia


-CXR shows Interval development of confluent infiltrate in the right lung base 

likely represents an infectious process


-Blood Cultures no growth x 48 hours


-Continue IV Abx


-Supportive care





Acute CHF (new onset)


-BNP elevated at 9110 on admission


-CXR showed Interval development of small bilateral pleural effusions with 

associated compressive atelectasis


-Continue ASA and ACE, will start on BB


-Continue IV Lasix per cardiology recommendation


-Echo revealed EF 15-20%, LV mod dilated, mild diastolic dysfxn, mild MR, mod L 

pleural effusion. 





Elevated troponin


-Troponin elevated on admission





Acute hypoxic respiratory failure


-Etiology secondary to CHF and pneumonia


-Albuterol as needed


-Supplemental oxygen as needed





HTN


-Monitor BP


-Continue anti-hypertensive meds 





DM


-With hyperglycemia


-BG on admission 201


-POC BG monitoring


-SSI coverage prn


-HgbA1C pending





GI and DVT PPX


-On heparin and Pepcid





6/12/2021.  Patient with increasing creatinine.  Renal ultrasound negative for h

ydronephrosis.  Cardiology started Lasix twice daily.  No ACE inhibitor or ARB 

secondary to renal function.  Cardiology plans for ischemic evaluation on Monday

if stable.  Continue IV antibiotics.  Follow-up chest x-ray in a.m.





History


Interval history: 





No new issues overnight.





Hospitalist Physical





- Constitutional


Vitals: 


                                        











Temp Pulse Resp BP Pulse Ox


 


 97.8 F   88   18   123/65   95 


 


 06/12/21 03:54  06/12/21 09:18  06/12/21 08:00  06/12/21 09:18  06/12/21 09:39











General appearance: Present: no acute distress





- EENT


Eyes: Present: PERRL, EOM intact


ENT: hearing intact, clear oral mucosa, dentition normal





- Neck


Neck: Present: supple, normal ROM





- Respiratory


Respiratory effort: normal


Respiratory: bilateral: CTA





- Cardiovascular


Rhythm: regular


Heart Sounds: Present: S1 & S2.  Absent: gallop, rub





- Extremities


Extremities: no ischemia, No edema, Full ROM





- Abdominal


General gastrointestinal: soft, non-tender, non-distended, normal bowel sounds





- Integumentary


Integumentary: Present: clear, warm, dry





- Neurologic


Neurologic: CNII-XII intact, moves all extremities





HEART Score





- HEART Score


EKG: Non-specific


Age: 45-65


Risk factors: > 3 risk factors or hx of atherosclerotic disease


Troponin: 


                                        











Troponin T  0.036 ng/mL (0.00-0.029)  H  06/10/21  04:17    














Results





- Labs


CBC & Chem 7: 


                                 06/09/21 14:27





                                 06/12/21 05:02


Labs: 


                             Laboratory Last Values











WBC  8.5 K/mm3 (4.5-11.0)   06/09/21  14:27    


 


RBC  3.69 M/mm3 (3.65-5.03)   06/09/21  14:27    


 


Hgb  11.4 gm/dl (10.1-14.3)   06/09/21  14:27    


 


Hct  34.9 % (30.3-42.9)   06/09/21  14:27    


 


MCV  95 fl (79-97)   06/09/21  14:27    


 


MCH  31 pg (28-32)   06/09/21  14:27    


 


MCHC  33 % (30-34)   06/09/21  14:27    


 


RDW  13.8 % (13.2-15.2)   06/09/21  14:27    


 


Plt Count  412 K/mm3 (140-440)   06/09/21  14:27    


 


Lymph % (Auto)  14.7 % (13.4-35.0)   06/09/21  14:27    


 


Mono % (Auto)  8.6 % (0.0-7.3)  H  06/09/21  14:27    


 


Eos % (Auto)  1.1 % (0.0-4.3)   06/09/21  14:27    


 


Baso % (Auto)  0.9 % (0.0-1.8)   06/09/21  14:27    


 


Lymph # (Auto)  1.3 K/mm3 (1.2-5.4)   06/09/21  14:27    


 


Mono # (Auto)  0.7 K/mm3 (0.0-0.8)   06/09/21  14:27    


 


Eos # (Auto)  0.1 K/mm3 (0.0-0.4)   06/09/21  14:27    


 


Baso # (Auto)  0.1 K/mm3 (0.0-0.1)   06/09/21  14:27    


 


Seg Neutrophils %  74.7 % (40.0-70.0)  H  06/09/21  14:27    


 


Seg Neutrophils #  6.4 K/mm3 (1.8-7.7)   06/09/21  14:27    


 


Sodium  134 mmol/L (137-145)  L  06/12/21  05:02    


 


Potassium  4.1 mmol/L (3.6-5.0)   06/12/21  05:02    


 


Chloride  100.1 mmol/L ()   06/12/21  05:02    


 


Carbon Dioxide  20 mmol/L (22-30)  L  06/12/21  05:02    


 


Anion Gap  18 mmol/L  06/12/21  05:02    


 


BUN  52 mg/dL (7-17)  H  06/12/21  05:02    


 


Creatinine  2.3 mg/dL (0.6-1.2)  H  06/12/21  05:02    


 


Estimated GFR  26 ml/min  06/12/21  05:02    


 


BUN/Creatinine Ratio  23 %  06/12/21  05:02    


 


Glucose  138 mg/dL ()  H  06/12/21  05:02    


 


POC Glucose  150 mg/dL ()  H  06/12/21  07:40    


 


Calcium  8.7 mg/dL (8.4-10.2)   06/12/21  05:02    


 


Total Bilirubin  0.30 mg/dL (0.1-1.2)   06/09/21  14:27    


 


AST  22 units/L (5-40)   06/09/21  14:27    


 


ALT  29 units/L (7-56)   06/09/21  14:27    


 


Alkaline Phosphatase  73 units/L ()   06/09/21  14:27    


 


Troponin T  0.036 ng/mL (0.00-0.029)  H  06/10/21  04:17    


 


NT-Pro-B Natriuret Pep  9110 pg/mL (0-900)  H  06/09/21  14:27    


 


Total Protein  7.2 g/dL (6.3-8.2)   06/09/21  14:27    


 


Albumin  3.9 g/dL (3.9-5)   06/09/21  14:27    


 


Albumin/Globulin Ratio  1.2 %  06/09/21  14:27    


 


Triglycerides  92 mg/dL (2-149)   06/09/21  21:37    


 


Cholesterol  142 mg/dL ()   06/09/21  21:37    


 


LDL Cholesterol Direct  91 mg/dL ()   06/09/21  21:37    


 


HDL Cholesterol  53 mg/dL (40-59)   06/09/21  21:37    


 


Cholesterol/HDL Ratio  2.67 %  06/09/21  21:37    


 


Urine Color  Straw  (Yellow)   06/11/21  06:00    


 


Urine Turbidity  Clear  (Clear)   06/11/21  06:00    


 


Urine pH  6.0  (5.0-7.0)   06/11/21  06:00    


 


Ur Specific Gravity  1.009  (1.003-1.030)   06/11/21  06:00    


 


Urine Protein  <15 mg/dl mg/dL (Negative)   06/11/21  06:00    


 


Urine Glucose (UA)  Neg mg/dL (Negative)   06/11/21  06:00    


 


Urine Ketones  Neg mg/dL (Negative)   06/11/21  06:00    


 


Urine Blood  Neg  (Negative)   06/11/21  06:00    


 


Urine Nitrite  Neg  (Negative)   06/11/21  06:00    


 


Urine Bilirubin  Neg  (Negative)   06/11/21  06:00    


 


Urine Urobilinogen  < 2.0 mg/dL (<2.0)   06/11/21  06:00    


 


Ur Leukocyte Esterase  Neg  (Negative)   06/11/21  06:00    


 


Urine WBC (Auto)  < 1.0 /HPF (0.0-6.0)   06/11/21  06:00    


 


Urine RBC (Auto)  1.0 /HPF (0.0-6.0)   06/11/21  06:00    


 


U Epithel Cells (Auto)  1.0 /HPF (0-13.0)   06/11/21  06:00    


 


Urine Mucus  Few /HPF  06/11/21  06:00    


 


Urine Creatinine  49.1 mg/dL (0.1-20.0)  H  06/11/21  06:00    


 


Urine Sodium  97 mmol/L  06/11/21  06:00    


 


Nasal Screen MRSA (PCR)  Negative  (Negative)   06/10/21  Unknown











Microbiology: 


Microbiology





06/10/21 00:16   Peripheral/Venous   Blood Culture - Preliminary


                            NO GROWTH AFTER 48 HOURS


06/10/21 00:09   Peripheral/Venous   Blood Culture - Preliminary


                            NO GROWTH AFTER 48 HOURS








Ayon/IV: 


                                        





Voiding Method                   Toilet











Active Medications





- Current Medications


Current Medications: 














Generic Name Dose Route Start Last Admin





  Trade Name Freq  PRN Reason Stop Dose Admin


 


Acetaminophen  650 mg  06/09/21 23:40 





  Acetaminophen 325 Mg Tab  PO  





  Q4H PRN  





  Pain MILD(1-3)/Fever >100.5/HA  


 


Albuterol  2.5 mg  06/09/21 23:40 





  Albuterol 2.5 Mg/3 Ml Nebu  IH  





  Q3HRT PRN  





  Shortness Of Breath  


 


Aspirin  325 mg  06/10/21 10:00  06/12/21 09:18





  Aspirin Ec 325 Mg Tab  PO   325 mg





  QDAY KINJAL   Administration


 


Dextrose  50 ml  06/09/21 23:40 





  Dextrose 50% In Water (25gm) 50 Ml Syringe  IV  





  Q30MIN PRN  





  Hypoglycemia  





  Protocol  


 


Docusate Sodium  100 mg  06/10/21 10:00  06/12/21 09:17





  Docusate Sodium 100 Mg Cap  PO   100 mg





  BID KINJAL   Administration


 


Famotidine  10 mg  06/10/21 10:00  06/12/21 09:18





  Famotidine 10 Mg Tab  PO   10 mg





  BID KINJAL   Administration


 


Furosemide  40 mg  06/10/21 06:00  06/12/21 05:38





  Furosemide 40 Mg/4 Ml Inj  IV   40 mg





  BID@0600,1800 KINJAL   Administration


 


Guaifenesin  600 mg  06/10/21 01:00  06/12/21 09:17





  Guaifenesin Er 600 Mg Tab  PO   600 mg





  BID KINJAL   Administration


 


Heparin Sodium (Porcine)  5,000 unit  06/10/21 10:00  06/12/21 09:17





  Heparin 5,000 Unit/1 Ml Vial  SUB-Q   5,000 unit





  BID KINJAL   Administration


 


Hydralazine HCl  75 mg  06/10/21 06:00  06/12/21 05:38





  Hydralazine 25 Mg Tab  PO   75 mg





  Q8HR KINJAL   Administration


 


Ceftriaxone Sodium  1 gm in 50 mls @ 100 mls/hr  06/10/21 10:00  06/12/21 09:23





  Rocephin/Ns 1 Gm/50 Ml  IV  06/14/21 10:29  100 mls/hr





  Q24HR KINJAL   Administration





  Protocol  


 


Insulin Human Lispro  0 unit  06/10/21 07:30  06/12/21 09:16





  Insulin Lispro 100 Unit/Ml  SUB-Q   2 unit





  ACHS KINJAL   Administration





  Protocol  


 


Metoprolol Tartrate  25 mg  06/11/21 22:00  06/12/21 09:18





  Metoprolol Tartrate 25 Mg Tab  PO   25 mg





  BID KINJAL   Administration


 


Nifedipine  30 mg  06/10/21 10:00  06/12/21 09:17





  Nifedipine Xl 30 Mg Tab  PO   30 mg





  Q12HR KINJAL   Administration


 


Nitroglycerin  0.4 mg  06/09/21 23:42 





  Nitroglycerin 0.4 Mg Tab Subl  SL  





  .Q5MIN PRN  





  Chest Pain  


 


Ondansetron HCl  4 mg  06/09/21 23:40 





  Ondansetron 4 Mg/2 Ml Inj  IV  





  Q6H PRN  





  Nausea And Vomiting  


 


Pravastatin Sodium  80 mg  06/10/21 22:00  06/11/21 21:20





  Pravastatin 80 Mg Tab  PO   80 mg





  QHS KINJAL   Administration


 


Sodium Chloride  10 ml  06/10/21 10:00  06/12/21 09:18





  Sodium Chloride 0.9% 10 Ml Flush Syringe  IV   10 ml





  BID KINJAL   Administration


 


Sodium Chloride  10 ml  06/09/21 23:40 





  Sodium Chloride 0.9% 10 Ml Flush Syringe  IV  





  PRN PRN  





  LINE FLUSH

## 2021-06-12 NOTE — XRAY REPORT
CHEST 1 VIEW 



INDICATION / CLINICAL INFORMATION:

PNA, CHF.



COMPARISON: 

Chest radiograph 6/9/2021



FINDINGS:



SUPPORT DEVICES: None.



HEART / MEDIASTINUM: Stable cardiomegaly. 



LUNGS / PLEURA: There has been interval resolution of previously seen bibasilar pulmonary opacities. 
The lungs are now clear. No pleural effusion. No pneumothorax. 



ADDITIONAL FINDINGS: No significant additional findings.



IMPRESSION:

1. Interval resolution of previously seen bibasilar pulmonary opacities. The lungs are now clear.



Signer Name: Eva Renae MD 

Signed: 6/12/2021 10:29 AM

Workstation Name: [id number]

## 2021-06-13 LAB
BUN SERPL-MCNC: 53 MG/DL (ref 7–17)
BUN/CREAT SERPL: 27 %
CALCIUM SERPL-MCNC: 8.9 MG/DL (ref 8.4–10.2)
HEMOLYSIS INDEX: 7

## 2021-06-13 RX ADMIN — Medication SCH ML: at 09:15

## 2021-06-13 RX ADMIN — METOPROLOL TARTRATE SCH MG: 25 TABLET, FILM COATED ORAL at 09:14

## 2021-06-13 RX ADMIN — DOCUSATE SODIUM SCH MG: 100 CAPSULE, LIQUID FILLED ORAL at 09:15

## 2021-06-13 RX ADMIN — Medication SCH ML: at 21:48

## 2021-06-13 RX ADMIN — FAMOTIDINE SCH MG: 10 TABLET ORAL at 09:14

## 2021-06-13 RX ADMIN — FAMOTIDINE SCH MG: 10 TABLET ORAL at 21:46

## 2021-06-13 RX ADMIN — CEFTRIAXONE SODIUM SCH MLS/HR: 1 INJECTION, POWDER, FOR SOLUTION INTRAMUSCULAR; INTRAVENOUS at 09:14

## 2021-06-13 RX ADMIN — INSULIN LISPRO SCH UNIT: 100 INJECTION, SOLUTION INTRAVENOUS; SUBCUTANEOUS at 09:13

## 2021-06-13 RX ADMIN — METOPROLOL TARTRATE SCH MG: 25 TABLET, FILM COATED ORAL at 21:47

## 2021-06-13 RX ADMIN — PRAVASTATIN SODIUM SCH MG: 80 TABLET ORAL at 21:46

## 2021-06-13 RX ADMIN — NIFEDIPINE SCH MG: 30 TABLET, FILM COATED, EXTENDED RELEASE ORAL at 09:15

## 2021-06-13 RX ADMIN — HEPARIN SODIUM SCH UNIT: 5000 INJECTION, SOLUTION INTRAVENOUS; SUBCUTANEOUS at 09:15

## 2021-06-13 RX ADMIN — FUROSEMIDE SCH MG: 40 TABLET ORAL at 09:15

## 2021-06-13 RX ADMIN — INSULIN LISPRO SCH UNIT: 100 INJECTION, SOLUTION INTRAVENOUS; SUBCUTANEOUS at 21:48

## 2021-06-13 RX ADMIN — DOCUSATE SODIUM SCH MG: 100 CAPSULE, LIQUID FILLED ORAL at 21:47

## 2021-06-13 RX ADMIN — HEPARIN SODIUM SCH UNIT: 5000 INJECTION, SOLUTION INTRAVENOUS; SUBCUTANEOUS at 21:48

## 2021-06-13 RX ADMIN — NIFEDIPINE SCH MG: 30 TABLET, FILM COATED, EXTENDED RELEASE ORAL at 21:47

## 2021-06-13 RX ADMIN — INSULIN LISPRO SCH UNIT: 100 INJECTION, SOLUTION INTRAVENOUS; SUBCUTANEOUS at 12:10

## 2021-06-13 RX ADMIN — INSULIN LISPRO SCH: 100 INJECTION, SOLUTION INTRAVENOUS; SUBCUTANEOUS at 16:22

## 2021-06-13 RX ADMIN — ASPIRIN SCH MG: 325 TABLET, COATED ORAL at 09:15

## 2021-06-13 NOTE — PROGRESS NOTE
Assessment and Plan


Echo reviewed - EF 15-20%, LV mod dilated, mild diastolic dysfxn, mild MR, mod L

pleural effusion. 


Transitioned to PO Lasix this AM. 


Continue BB. No ACEI/ARB/ARNI for now due to renal fxn. 


Minimal CE elevation noted in the setting of acute HF and JENNIFER. Pt denies chest 

pain. No acute ischemic changes on ECG. 


Plan for Lexiscan stress MPI in AM. NPO after midnight. 





Pt seen in conjunction with Dr. Yanes, who agrees with the assessment and plan

of care. 





- Patient Problems


(1) Acute HFrEF (heart failure with reduced ejection fraction)


Current Visit: Yes   Status: Acute   





(2) Pleural effusion


Current Visit: Yes   Status: Acute   





(3) Cardiomyopathy


Current Visit: Yes   Status: Acute   





(4) JENNIFER (acute kidney injury)


Current Visit: Yes   Status: Acute   





(5) Elevated troponin


Current Visit: Yes   Status: Acute   





(6) HTN (hypertension)


Current Visit: Yes   Status: Chronic   


Qualifiers: 


   Hypertension type: essential hypertension   Qualified Code(s): I10 - 

Essential (primary) hypertension   





(7) HLD (hyperlipidemia)


Current Visit: Yes   Status: Chronic   


Qualifiers: 


   Hyperlipidemia type: mixed hyperlipidemia   Qualified Code(s): E78.2 - Mixed 

hyperlipidemia   





(8) DM2 (diabetes mellitus, type 2)


Current Visit: Yes   Status: Chronic   





(9) Carotid artery stenosis


Current Visit: Yes   Status: Chronic   





(10) Chronic back pain


Current Visit: Yes   Status: Chronic   





Subjective


Date of service: 06/13/21


Principal diagnosis: Acute HFrEF/New CMP


Interval history: 


Resting comfortably in bed. States she is feeling good. No SOB or additional 

cardiac complaints. Tele reviewed - SR 70s, no events overnight. 





Objective


Last Vital Signs











Temp  97.9 F   06/13/21 08:10


 


Pulse  77   06/13/21 09:14


 


Resp  16   06/13/21 08:10


 


BP  117/75   06/13/21 09:14


 


Pulse Ox  97   06/13/21 08:10











- Physical Examination


General: No Apparent Distress


HEENT: Positive: EOMI, Normocephaly, Mucus Membranes Moist


Neck: Positive: neck supple, trachea midline.  Negative: JVD/HJR


Cardiac: Positive: Reg Rate and Rhythm, S1/S2


Lungs: Positive: clear to auscultation (bilaterally)


Neuro: Positive: Grossly Intact


Abdomen: Positive: Soft.  Negative: Tender


Skin: Negative: Rash


Musculoskeletal: No Pain, Normal Range of Motion


Extremities: Present: lower extr. pulses.  Absent: edema





- Labs and Meds


                          Comprehensive Metabolic Panel











  06/13/21 Range/Units





  05:25 


 


Sodium  138  (137-145)  mmol/L


 


Potassium  4.1  (3.6-5.0)  mmol/L


 


Chloride  101.9  ()  mmol/L


 


Carbon Dioxide  20 L  (22-30)  mmol/L


 


BUN  53 H  (7-17)  mg/dL


 


Creatinine  2.0 H  (0.6-1.2)  mg/dL


 


Glucose  138 H  ()  mg/dL


 


Calcium  8.9  (8.4-10.2)  mg/dL














- Imaging and Cardiology


EKG: report reviewed, image reviewed


Pharmacologic stress test: pending


Echo: report reviewed (6/10/2021 - EF 15-20%, LV mod dilated, mild diastolic 

dysfxn, mild MR, mod L pleural effusion), other (7/2020 - EF 55-60%, mild 

concentric LVH, trace MR, trace AR, trace TR)





- Telemetry


EKG Rhythm: Sinus Rhythm





- EKG


Sinus rhythms and dysrhythmias: sinus rhythm


Chamber hypertrophy or enlargement: left ventricular hypertro


Repolarization changes or abnormalities: nonspecific abnormality, ST segment, 

and/or T wave

## 2021-06-13 NOTE — PROGRESS NOTE
Assessment and Plan


Assessment and plan: 





Pneumonia


-CXR shows Interval development of confluent infiltrate in the right lung base 

likely represents an infectious process


-Blood Cultures no growth x 48 hours


-Continue IV Abx


-Supportive care





Acute CHF (new onset)


-BNP elevated at 9110 on admission


-CXR showed Interval development of small bilateral pleural effusions with 

associated compressive atelectasis


-Continue ASA and ACE, will start on BB


-Continue IV Lasix per cardiology recommendation


-Echo revealed EF 15-20%, LV mod dilated, mild diastolic dysfxn, mild MR, mod L 

pleural effusion. 





Elevated troponin


-Troponin elevated on admission





Acute hypoxic respiratory failure


-Etiology secondary to CHF and pneumonia


-Albuterol as needed


-Supplemental oxygen as needed





HTN


-Monitor BP


-Continue anti-hypertensive meds 





DM


-With hyperglycemia


-BG on admission 201


-POC BG monitoring


-SSI coverage prn


-HgbA1C pending





GI and DVT PPX


-On heparin and Pepcid





6/12/2021.  Patient with increasing creatinine.  Renal ultrasound negative for h

ydronephrosis.  Cardiology started Lasix twice daily.  No ACE inhibitor or ARB 

secondary to renal function.  Cardiology plans for ischemic evaluation on Monday

if stable.  Continue IV antibiotics.  Follow-up chest x-ray in a.m.





6/13/2021.  Creatinine slightly improved this morning.  Patient for ischemic 

evaluation on tomorrow.  Follow-up chest x-ray reveals resolution of previously 

seen bibasilar pulmonary opacities.  The lungs now are completely clear.  

Discontinue antibiotics.





History


Interval history: 





No new issues overnight.





Hospitalist Physical





- Constitutional


Vitals: 


                                        











Temp Pulse Resp BP Pulse Ox


 


 98.4 F   83   18   151/91   97 


 


 06/13/21 03:55  06/13/21 03:55  06/13/21 07:45  06/13/21 03:55  06/13/21 07:45











General appearance: Present: no acute distress





- EENT


Eyes: Present: PERRL, EOM intact


ENT: hearing intact, clear oral mucosa, dentition normal





- Neck


Neck: Present: supple, normal ROM





- Respiratory


Respiratory effort: normal


Respiratory: bilateral: CTA





- Cardiovascular


Rhythm: regular


Heart Sounds: Present: S1 & S2.  Absent: gallop, rub





- Extremities


Extremities: no ischemia, No edema, Full ROM





- Abdominal


General gastrointestinal: soft, non-tender, non-distended, normal bowel sounds





- Integumentary


Integumentary: Present: clear, warm, dry





- Neurologic


Neurologic: CNII-XII intact, moves all extremities





HEART Score





- HEART Score


EKG: Non-specific


Age: 45-65


Risk factors: > 3 risk factors or hx of atherosclerotic disease


Troponin: 


                                        











Troponin T  0.036 ng/mL (0.00-0.029)  H  06/10/21  04:17    














Results





- Labs


CBC & Chem 7: 


                                 06/09/21 14:27





                                 06/13/21 05:25


Labs: 


                             Laboratory Last Values











WBC  8.5 K/mm3 (4.5-11.0)   06/09/21  14:27    


 


RBC  3.69 M/mm3 (3.65-5.03)   06/09/21  14:27    


 


Hgb  11.4 gm/dl (10.1-14.3)   06/09/21  14:27    


 


Hct  34.9 % (30.3-42.9)   06/09/21  14:27    


 


MCV  95 fl (79-97)   06/09/21  14:27    


 


MCH  31 pg (28-32)   06/09/21  14:27    


 


MCHC  33 % (30-34)   06/09/21  14:27    


 


RDW  13.8 % (13.2-15.2)   06/09/21  14:27    


 


Plt Count  412 K/mm3 (140-440)   06/09/21  14:27    


 


Lymph % (Auto)  14.7 % (13.4-35.0)   06/09/21  14:27    


 


Mono % (Auto)  8.6 % (0.0-7.3)  H  06/09/21  14:27    


 


Eos % (Auto)  1.1 % (0.0-4.3)   06/09/21  14:27    


 


Baso % (Auto)  0.9 % (0.0-1.8)   06/09/21  14:27    


 


Lymph # (Auto)  1.3 K/mm3 (1.2-5.4)   06/09/21  14:27    


 


Mono # (Auto)  0.7 K/mm3 (0.0-0.8)   06/09/21  14:27    


 


Eos # (Auto)  0.1 K/mm3 (0.0-0.4)   06/09/21  14:27    


 


Baso # (Auto)  0.1 K/mm3 (0.0-0.1)   06/09/21  14:27    


 


Seg Neutrophils %  74.7 % (40.0-70.0)  H  06/09/21  14:27    


 


Seg Neutrophils #  6.4 K/mm3 (1.8-7.7)   06/09/21  14:27    


 


Sodium  138 mmol/L (137-145)   06/13/21  05:25    


 


Potassium  4.1 mmol/L (3.6-5.0)   06/13/21  05:25    


 


Chloride  101.9 mmol/L ()   06/13/21  05:25    


 


Carbon Dioxide  20 mmol/L (22-30)  L  06/13/21  05:25    


 


Anion Gap  20 mmol/L  06/13/21  05:25    


 


BUN  53 mg/dL (7-17)  H  06/13/21  05:25    


 


Creatinine  2.0 mg/dL (0.6-1.2)  H  06/13/21  05:25    


 


Estimated GFR  30 ml/min  06/13/21  05:25    


 


BUN/Creatinine Ratio  27 %  06/13/21  05:25    


 


Glucose  138 mg/dL ()  H  06/13/21  05:25    


 


POC Glucose  134 mg/dL ()  H  06/12/21  21:25    


 


Calcium  8.9 mg/dL (8.4-10.2)   06/13/21  05:25    


 


Total Bilirubin  0.30 mg/dL (0.1-1.2)   06/09/21  14:27    


 


AST  22 units/L (5-40)   06/09/21  14:27    


 


ALT  29 units/L (7-56)   06/09/21  14:27    


 


Alkaline Phosphatase  73 units/L ()   06/09/21  14:27    


 


Troponin T  0.036 ng/mL (0.00-0.029)  H  06/10/21  04:17    


 


NT-Pro-B Natriuret Pep  9110 pg/mL (0-900)  H  06/09/21  14:27    


 


Total Protein  7.2 g/dL (6.3-8.2)   06/09/21  14:27    


 


Albumin  3.9 g/dL (3.9-5)   06/09/21  14:27    


 


Albumin/Globulin Ratio  1.2 %  06/09/21  14:27    


 


Triglycerides  92 mg/dL (2-149)   06/09/21  21:37    


 


Cholesterol  142 mg/dL ()   06/09/21  21:37    


 


LDL Cholesterol Direct  91 mg/dL ()   06/09/21  21:37    


 


HDL Cholesterol  53 mg/dL (40-59)   06/09/21  21:37    


 


Cholesterol/HDL Ratio  2.67 %  06/09/21  21:37    


 


Urine Color  Straw  (Yellow)   06/11/21  06:00    


 


Urine Turbidity  Clear  (Clear)   06/11/21  06:00    


 


Urine pH  6.0  (5.0-7.0)   06/11/21  06:00    


 


Ur Specific Gravity  1.009  (1.003-1.030)   06/11/21  06:00    


 


Urine Protein  <15 mg/dl mg/dL (Negative)   06/11/21  06:00    


 


Urine Glucose (UA)  Neg mg/dL (Negative)   06/11/21  06:00    


 


Urine Ketones  Neg mg/dL (Negative)   06/11/21  06:00    


 


Urine Blood  Neg  (Negative)   06/11/21  06:00    


 


Urine Nitrite  Neg  (Negative)   06/11/21  06:00    


 


Urine Bilirubin  Neg  (Negative)   06/11/21  06:00    


 


Urine Urobilinogen  < 2.0 mg/dL (<2.0)   06/11/21  06:00    


 


Ur Leukocyte Esterase  Neg  (Negative)   06/11/21  06:00    


 


Urine WBC (Auto)  < 1.0 /HPF (0.0-6.0)   06/11/21  06:00    


 


Urine RBC (Auto)  1.0 /HPF (0.0-6.0)   06/11/21  06:00    


 


U Epithel Cells (Auto)  1.0 /HPF (0-13.0)   06/11/21  06:00    


 


Urine Mucus  Few /HPF  06/11/21  06:00    


 


Urine Creatinine  49.1 mg/dL (0.1-20.0)  H  06/11/21  06:00    


 


Urine Sodium  97 mmol/L  06/11/21  06:00    


 


Nasal Screen MRSA (PCR)  Negative  (Negative)   06/10/21  Unknown











Microbiology: 


Microbiology





06/10/21 00:16   Peripheral/Venous   Blood Culture - Preliminary


                            NO GROWTH AFTER 72 HOURS


06/10/21 00:09   Peripheral/Venous   Blood Culture - Preliminary


                            NO GROWTH AFTER 72 HOURS








[place]/IV: 


                                        





Voiding Method                   Toilet











Active Medications





- Current Medications


Current Medications: 














Generic Name Dose Route Start Last Admin





  Trade Name Freq  PRN Reason Stop Dose Admin


 


Acetaminophen  650 mg  06/09/21 23:40 





  Acetaminophen 325 Mg Tab  PO  





  Q4H PRN  





  Pain MILD(1-3)/Fever >100.5/HA  


 


Albuterol  2.5 mg  06/09/21 23:40 





  Albuterol 2.5 Mg/3 Ml Nebu  IH  





  Q3HRT PRN  





  Shortness Of Breath  


 


Aspirin  325 mg  06/10/21 10:00  06/12/21 09:18





  Aspirin Ec 325 Mg Tab  PO   325 mg





  QDAY KINJAL   Administration


 


Dextrose  50 ml  06/09/21 23:40 





  Dextrose 50% In Water (25gm) 50 Ml Syringe  IV  





  Q30MIN PRN  





  Hypoglycemia  





  Protocol  


 


Docusate Sodium  100 mg  06/10/21 10:00  06/12/21 21:50





  Docusate Sodium 100 Mg Cap  PO   100 mg





  BID KINJAL   Administration


 


Famotidine  10 mg  06/10/21 10:00  06/12/21 21:50





  Famotidine 10 Mg Tab  PO   10 mg





  BID KINJAL   Administration


 


Furosemide  40 mg  06/13/21 10:00 





  Furosemide 40 Mg Tab  PO  





  QDAY KINJAL  


 


Guaifenesin  600 mg  06/10/21 01:00  06/12/21 21:50





  Guaifenesin Er 600 Mg Tab  PO   600 mg





  BID KINJAL   Administration


 


Heparin Sodium (Porcine)  5,000 unit  06/10/21 10:00  06/12/21 21:51





  Heparin 5,000 Unit/1 Ml Vial  SUB-Q   5,000 unit





  BID KINJAL   Administration


 


Hydralazine HCl  75 mg  06/10/21 06:00  06/13/21 05:43





  Hydralazine 25 Mg Tab  PO   75 mg





  Q8HR KINJAL   Administration


 


Ceftriaxone Sodium  1 gm in 50 mls @ 100 mls/hr  06/10/21 10:00  06/12/21 09:23





  Rocephin/Ns 1 Gm/50 Ml  IV  06/14/21 10:29  100 mls/hr





  Q24HR KINJAL   Administration





  Protocol  


 


Insulin Human Lispro  0 unit  06/10/21 07:30  06/12/21 21:49





  Insulin Lispro 100 Unit/Ml  SUB-Q   Not Given





  ACHS KINJAL  





  Protocol  


 


Metoprolol Tartrate  25 mg  06/11/21 22:00  06/12/21 21:50





  Metoprolol Tartrate 25 Mg Tab  PO   25 mg





  BID KINJAL   Administration


 


Nifedipine  30 mg  06/10/21 10:00  06/12/21 21:50





  Nifedipine Xl 30 Mg Tab  PO   30 mg





  Q12HR KINJAL   Administration


 


Nitroglycerin  0.4 mg  06/09/21 23:42 





  Nitroglycerin 0.4 Mg Tab Subl  SL  





  .Q5MIN PRN  





  Chest Pain  


 


Ondansetron HCl  4 mg  06/09/21 23:40 





  Ondansetron 4 Mg/2 Ml Inj  IV  





  Q6H PRN  





  Nausea And Vomiting  


 


Pravastatin Sodium  80 mg  06/10/21 22:00  06/12/21 21:50





  Pravastatin 80 Mg Tab  PO   80 mg





  QHS KINJAL   Administration


 


Sodium Chloride  10 ml  06/10/21 10:00  06/12/21 21:51





  Sodium Chloride 0.9% 10 Ml Flush Syringe  IV   10 ml





  BID KINJAL   Administration


 


Sodium Chloride  10 ml  06/09/21 23:40 





  Sodium Chloride 0.9% 10 Ml Flush Syringe  IV  





  PRN PRN  





  LINE FLUSH

## 2021-06-14 LAB
BASOPHILS # (AUTO): 0.1 K/MM3 (ref 0–0.1)
BASOPHILS NFR BLD AUTO: 1.2 % (ref 0–1.8)
BUN SERPL-MCNC: 52 MG/DL (ref 7–17)
BUN/CREAT SERPL: 26 %
CALCIUM SERPL-MCNC: 8.4 MG/DL (ref 8.4–10.2)
EOSINOPHIL # BLD AUTO: 0.5 K/MM3 (ref 0–0.4)
EOSINOPHIL NFR BLD AUTO: 5.5 % (ref 0–4.3)
HCT VFR BLD CALC: 30.8 % (ref 30.3–42.9)
HEMOLYSIS INDEX: 1
HGB BLD-MCNC: 10.6 GM/DL (ref 10.1–14.3)
LYMPHOCYTES # BLD AUTO: 2.3 K/MM3 (ref 1.2–5.4)
LYMPHOCYTES NFR BLD AUTO: 27.2 % (ref 13.4–35)
MCHC RBC AUTO-ENTMCNC: 34 % (ref 30–34)
MCV RBC AUTO: 95 FL (ref 79–97)
MONOCYTES # (AUTO): 1.1 K/MM3 (ref 0–0.8)
MONOCYTES % (AUTO): 12.9 % (ref 0–7.3)
PLATELET # BLD: 358 K/MM3 (ref 140–440)
RBC # BLD AUTO: 3.26 M/MM3 (ref 3.65–5.03)

## 2021-06-14 RX ADMIN — METOPROLOL TARTRATE SCH MG: 25 TABLET, FILM COATED ORAL at 09:58

## 2021-06-14 RX ADMIN — Medication SCH ML: at 22:17

## 2021-06-14 RX ADMIN — FAMOTIDINE SCH MG: 10 TABLET ORAL at 09:58

## 2021-06-14 RX ADMIN — NIFEDIPINE SCH MG: 30 TABLET, FILM COATED, EXTENDED RELEASE ORAL at 09:58

## 2021-06-14 RX ADMIN — PRAVASTATIN SODIUM SCH MG: 80 TABLET ORAL at 22:16

## 2021-06-14 RX ADMIN — INSULIN LISPRO SCH UNIT: 100 INJECTION, SOLUTION INTRAVENOUS; SUBCUTANEOUS at 17:00

## 2021-06-14 RX ADMIN — METOPROLOL TARTRATE SCH MG: 25 TABLET, FILM COATED ORAL at 22:16

## 2021-06-14 RX ADMIN — FAMOTIDINE SCH MG: 10 TABLET ORAL at 22:16

## 2021-06-14 RX ADMIN — CEFTRIAXONE SODIUM SCH MLS/HR: 1 INJECTION, POWDER, FOR SOLUTION INTRAMUSCULAR; INTRAVENOUS at 09:57

## 2021-06-14 RX ADMIN — NIFEDIPINE SCH MG: 30 TABLET, FILM COATED, EXTENDED RELEASE ORAL at 22:15

## 2021-06-14 RX ADMIN — ASPIRIN SCH MG: 325 TABLET, COATED ORAL at 09:58

## 2021-06-14 RX ADMIN — INSULIN LISPRO SCH UNIT: 100 INJECTION, SOLUTION INTRAVENOUS; SUBCUTANEOUS at 22:30

## 2021-06-14 RX ADMIN — INSULIN LISPRO SCH: 100 INJECTION, SOLUTION INTRAVENOUS; SUBCUTANEOUS at 09:59

## 2021-06-14 RX ADMIN — INSULIN LISPRO SCH: 100 INJECTION, SOLUTION INTRAVENOUS; SUBCUTANEOUS at 13:38

## 2021-06-14 RX ADMIN — HEPARIN SODIUM SCH UNIT: 5000 INJECTION, SOLUTION INTRAVENOUS; SUBCUTANEOUS at 22:16

## 2021-06-14 RX ADMIN — Medication SCH ML: at 12:13

## 2021-06-14 RX ADMIN — DOCUSATE SODIUM SCH MG: 100 CAPSULE, LIQUID FILLED ORAL at 09:58

## 2021-06-14 RX ADMIN — DOCUSATE SODIUM SCH MG: 100 CAPSULE, LIQUID FILLED ORAL at 22:15

## 2021-06-14 RX ADMIN — HEPARIN SODIUM SCH UNIT: 5000 INJECTION, SOLUTION INTRAVENOUS; SUBCUTANEOUS at 09:57

## 2021-06-14 RX ADMIN — FUROSEMIDE SCH MG: 40 TABLET ORAL at 09:57

## 2021-06-14 NOTE — PROGRESS NOTE
<FRANCISCO JAVIER NETTLES - Last Filed: 06/14/21 15:34>





Assessment and Plan





Acute heart failure reduced ejection fraction in setting of new cardiomyopathy


* Patient is currently chest pain-free.  She appears to be nearing euvolemia.  

  Continue Lasix 40 mg p.o. daily.  Repeat BMP in a.m.


* Echo reviewed - EF 15-20%, LV mod dilated, mild diastolic dysfxn, mild MR, mod

  L pleural effusion. 


* Exercise MPI stress test (6/14/2021): Mild inferior apical ischemia.  

  Estimated EF is 35%


* Continue goal-directed therapy with cardioprotective regimen: , 

  metoprolol 25 mg twice daily, pravastatin 80.  Plan to resume ACEI once JENNIFER is

  resolved.  Refer for outpatient cardiac rehab.


* LifeVest placement ordered in setting of severe cardiomyopathy with 

  ventricular ectopy demonstrated on telemetry. Scheduled to be delivered and 

  placed today.


* We will plan for left heart cath once cleared by nephrology, anticipate 

  Wednesday or Thursday.  Continue to monitor on telemetry





Elevated Troponin


* Troponin is elevated x3, subacute nonspecific and stable.  Continue to trend 

  CE's





Acute Kidney Injury


* No ACE/ARB in setting of JENNIFER.  Avoid nephrotoxic agents.  Nephrology is 

  following





Respiratory distress in setting of pleural effusion


* Management per primary team





DVT prophylaxis


* Heparin SQ





Plan for left heart cath once cleared by nephrology. Will follow


Patient should follow-up with Dr TONI Brower, [facility] heart specialist in our

[place] office on 7/2/21 at 3:30 PM.  #[phone]





Pt seen in conjunction with Dr. ALISHA Marc, who agrees with the assessment and 

plan of care. 





- Patient Problems


(1) Acute HFrEF (heart failure with reduced ejection fraction)


Current Visit: Yes   Status: Acute   





(2) Pleural effusion


Current Visit: Yes   Status: Acute   





(3) Cardiomyopathy


Current Visit: Yes   Status: Acute   





(4) JENNIFER (acute kidney injury)


Current Visit: Yes   Status: Acute   





(5) Elevated troponin


Current Visit: Yes   Status: Acute   





(6) HTN (hypertension)


Current Visit: Yes   Status: Chronic   


Qualifiers: 


   Hypertension type: essential hypertension   Qualified Code(s): I10 - 

Essential (primary) hypertension   





(7) HLD (hyperlipidemia)


Current Visit: Yes   Status: Chronic   


Qualifiers: 


   Hyperlipidemia type: mixed hyperlipidemia   Qualified Code(s): E78.2 - Mixed 

hyperlipidemia   





(8) DM2 (diabetes mellitus, type 2)


Current Visit: Yes   Status: Chronic   





(9) Carotid artery stenosis


Current Visit: Yes   Status: Chronic   





(10) Chronic back pain


Current Visit: Yes   Status: Chronic   








Subjective


Date of service: 06/14/21


Principal diagnosis: Acute HFrEF/New CMP


Interval history: 





Patient resting comfortably in bed.  No shortness of breath or chest pain 

overnight.


Telemetry reviewed: Sinus rhythm 83.  9 beat episode of V. tach noted overnight





Objective


                                        


                                Last Vital Signs











Temp  98.5 F   06/14/21 04:00


 


Pulse  79   06/14/21 06:05


 


Resp  20   06/14/21 04:00


 


BP  152/81   06/14/21 06:05


 


Pulse Ox  97   06/14/21 10:09

















- Physical Examination


General: No Apparent Distress


HEENT: Positive: EOMI, Normocephaly, Mucus Membranes Moist


Neck: Positive: neck supple, trachea midline.  Negative: JVD/HJR


Cardiac: Positive: Reg Rate and Rhythm, S1/S2


Lungs: Positive: Normal Exam, Normal Breath Sounds


Neuro: Positive: Grossly Intact


Abdomen: Positive: Soft.  Negative: Tender


Skin: Negative: Rash


Musculoskeletal: No Pain, Normal Range of Motion


Extremities: Present: lower extr. pulses.  Absent: edema





- Labs and Meds


                                       CBC











  06/14/21 Range/Units





  04:26 


 


WBC  8.3  (4.5-11.0)  K/mm3


 


RBC  3.26 L  (3.65-5.03)  M/mm3


 


Hgb  10.6  (10.1-14.3)  gm/dl


 


Hct  30.8  (30.3-42.9)  %


 


Plt Count  358  (140-440)  K/mm3


 


Lymph # (Auto)  2.3  (1.2-5.4)  K/mm3


 


Mono # (Auto)  1.1 H  (0.0-0.8)  K/mm3


 


Eos # (Auto)  0.5 H  (0.0-0.4)  K/mm3


 


Baso # (Auto)  0.1  (0.0-0.1)  K/mm3








                          Comprehensive Metabolic Panel











  06/14/21 Range/Units





  04:26 


 


Sodium  135 L  (137-145)  mmol/L


 


Potassium  4.2  (3.6-5.0)  mmol/L


 


Chloride  101.5  ()  mmol/L


 


Carbon Dioxide  21 L  (22-30)  mmol/L


 


BUN  52 H  (7-17)  mg/dL


 


Creatinine  2.0 H  (0.6-1.2)  mg/dL


 


Glucose  125 H  ()  mg/dL


 


Calcium  8.4  (8.4-10.2)  mg/dL














- Imaging and Cardiology


EKG: report reviewed, image reviewed


Echo: report reviewed (6/10/2021 - EF 15-20%, LV mod dilated, mild diastolic 

dysfxn, mild MR, mod L pleural effusion), other (7/2020 - EF 55-60%, mild 

concentric LVH, trace MR, trace AR, trace TR)





- EKG


Sinus rhythms and dysrhythmias: sinus rhythm


Chamber hypertrophy or enlargement: left ventricular hypertro


Repolarization changes or abnormalities: nonspecific abnormality, ST segment, 

and/or T wave





<RONNY MARC - Last Filed: 06/14/21 17:50>





Assessment and Plan





Acute HFrEF  15-20%


New cardiomyopathy


Acute Kidney Injury


     Exercise MPI stress test (6/14/2021): Mild inferior apical ischemia.  

Estimated EF is 35%


     We will plan for left heart cath once cleared by nephrology, anticipate 

Wednesday or Thursday

















Objective


                                   Vital Signs











  Temp Pulse Resp BP Pulse Ox


 


 06/14/21 10:09      97


 


 06/14/21 10:02     145/86 


 


 06/14/21 09:02     150/72 


 


 06/14/21 09:00     148/72 


 


 06/14/21 08:58     141/66 


 


 06/14/21 08:56     154/73 


 


 06/14/21 08:54     158/67 


 


 06/14/21 08:52     154/62 


 


 06/14/21 08:50     140/74 


 


 06/14/21 08:48     149/83 


 


 06/14/21 08:24     142/77 


 


 06/14/21 06:05   79   152/81 


 


 06/14/21 04:00  98.5 F  79  20  152/81  98


 


 06/14/21 03:33   69   


 


 06/14/21 00:52   700 H   


 


 06/14/21 00:37  98.4 F  75  20  150/83  97


 


 06/13/21 21:47   80   146/70 


 


 06/13/21 20:00   79    97


 


 06/13/21 19:17  98.6 F  80  20  146/70  97














- Labs and Meds


                                       CBC











  06/14/21 Range/Units





  04:26 


 


WBC  8.3  (4.5-11.0)  K/mm3


 


RBC  3.26 L  (3.65-5.03)  M/mm3


 


Hgb  10.6  (10.1-14.3)  gm/dl


 


Hct  30.8  (30.3-42.9)  %


 


Plt Count  358  (140-440)  K/mm3


 


Lymph # (Auto)  2.3  (1.2-5.4)  K/mm3


 


Mono # (Auto)  1.1 H  (0.0-0.8)  K/mm3


 


Eos # (Auto)  0.5 H  (0.0-0.4)  K/mm3


 


Baso # (Auto)  0.1  (0.0-0.1)  K/mm3








                          Comprehensive Metabolic Panel











  06/14/21 Range/Units





  04:26 


 


Sodium  135 L  (137-145)  mmol/L


 


Potassium  4.2  (3.6-5.0)  mmol/L


 


Chloride  101.5  ()  mmol/L


 


Carbon Dioxide  21 L  (22-30)  mmol/L


 


BUN  52 H  (7-17)  mg/dL


 


Creatinine  2.0 H  (0.6-1.2)  mg/dL


 


Glucose  125 H  ()  mg/dL


 


Calcium  8.4  (8.4-10.2)  mg/dL

## 2021-06-14 NOTE — PROGRESS NOTE
Assessment and Plan


1. Acute kidney injury:


JENNIFER in the setting of PNA and ?CHF.


Likely ATN.


UA bland.


Renal US negative for hydro.


Monitor renal function.


Creatinine level is same as yesterday.


Avoid diuretics.


Avoid nephrotoxic agents.


Meds dosage based on GFR.





2. FEN:


Anion-gap metabolic acidosis, monitor.


Appears euvolemic.


Monitor lytes and volume status.





3. Pneumonia:


S/p antibiotics.





4. CHF // Elevated troponin:


Followed by Cards.


Abnormal stress test, plan to do Cardiac cath 6/16 or 6/17.





5. DM type 2.





6. Hypertension:


BP controlled.











Subjective:


Patient was seen and examined at the bedside.


Doing ok.











Examination:


General appearance: well-developed, appears stated age, not in distress


HEENT: atraumatic, JENA


Neck: trachea midline


Respiratory: ctab


Heart: S1S2, regular, no murmur


Abdomen: soft, bowel sounds heard, NT


Integumentary: no obvious rash


Neurologic: AO, non-focal


Ext: no edema








Subjective


Date of service: 06/14/21


Principal diagnosis: Acute HFrEF/New CMP





Objective





- Vital Signs


Vital signs: 


                               Vital Signs - 12hr











  06/14/21 06/14/21 06/14/21





  03:33 04:00 06:05


 


Temperature  98.5 F 


 


Pulse Rate 69 79 79


 


Respiratory  20 





Rate   


 


Blood Pressure  152/81 152/81


 


O2 Sat by Pulse  98 





Oximetry   














  06/14/21 06/14/21 06/14/21





  08:24 08:48 08:50


 


Temperature   


 


Pulse Rate   


 


Respiratory   





Rate   


 


Blood Pressure 142/77 149/83 140/74


 


O2 Sat by Pulse   





Oximetry   














  06/14/21 06/14/21 06/14/21





  08:52 08:54 08:56


 


Temperature   


 


Pulse Rate   


 


Respiratory   





Rate   


 


Blood Pressure 154/62 158/67 154/73


 


O2 Sat by Pulse   





Oximetry   














  06/14/21 06/14/21 06/14/21





  08:58 09:00 09:02


 


Temperature   


 


Pulse Rate   


 


Respiratory   





Rate   


 


Blood Pressure 141/66 148/72 150/72


 


O2 Sat by Pulse   





Oximetry   














  06/14/21 06/14/21





  10:02 10:09


 


Temperature  


 


Pulse Rate  


 


Respiratory  





Rate  


 


Blood Pressure 145/86 


 


O2 Sat by Pulse  97





Oximetry  














- Lab





                                 06/15/21 04:59





                                 06/15/21 04:59


                             Most recent lab results











Calcium  8.4 mg/dL (8.4-10.2)   06/14/21  04:26    


 


Urine Creatinine  49.1 mg/dL (0.1-20.0)  H  06/11/21  06:00    


 


Urine Sodium  97 mmol/L  06/11/21  06:00    














Medications & Allergies





- Medications


Allergies/Adverse Reactions: 


                                    Allergies





No Known Allergies Allergy (Verified 02/12/14 15:34)


   








Home Medications: 


                                Home Medications











 Medication  Instructions  Recorded  Confirmed  Last Taken  Type


 


lisinopriL [Zestril TAB] 20 mg PO QDAY #30 tablet 02/14/14 06/10/21 06/09/21 Rx


 


Bisoprolol/Hctz (Nf) [Ziac 5/6.25 1 tab PO QDAY 08/30/16 06/10/21 06/09/21 

History





(Nf)]     


 


Ibuprofen [Motrin 800 MG tab] 800 mg PO TID 08/30/16 06/10/21 11/06/16 History





    400 MG 


 


Multivit-Min/FA/Lycopen/Lutein 1 each PO QDAY 08/30/16 06/10/21 06/08/21 History





[Centrum Silver Tablet]     


 


Nesina 25 mg PO DAILY 09/02/16 06/10/21 11/07/16 History


 


HYDROcodone/APAP 5-325 [[place] 1 - 2 each PO Q4HR PRN #30 tablet 11/08/16 

06/10/21 Unknown Rx





5-325 mg TAB]     


 


Aspirin EC [Ecotrin] 325 mg PO QDAY #30 tablet 07/05/20 06/10/21 06/08/21 Rx


 


NIFEdipine XL [Procardia Xl] 30 mg PO Q12HR #60 tablet 07/05/20 06/10/21 

06/09/21 Rx


 


Pravastatin [Pravachol] 80 mg PO QHS #30 tablet 07/05/20 06/10/21 06/09/21 Rx


 


hydrALAZINE [Apresoline TAB] 75 mg PO Q8HR #90 tablet 07/05/20 06/10/21 06/08/21

Rx











Active Medications: 














Generic Name Dose Route Start Last Admin





  Trade Name Freq  PRN Reason Stop Dose Admin


 


Acetaminophen  650 mg  06/09/21 23:40 





  Acetaminophen 325 Mg Tab  PO  





  Q4H PRN  





  Pain MILD(1-3)/Fever >100.5/HA  


 


Albuterol  2.5 mg  06/09/21 23:40 





  Albuterol 2.5 Mg/3 Ml Nebu  IH  





  Q3HRT PRN  





  Shortness Of Breath  


 


Aspirin  325 mg  06/10/21 10:00  06/14/21 09:58





  Aspirin Ec 325 Mg Tab  PO   325 mg





  QDAY KINJAL   Administration


 


Dextrose  50 ml  06/09/21 23:40 





  Dextrose 50% In Water (25gm) 50 Ml Syringe  IV  





  Q30MIN PRN  





  Hypoglycemia  





  Protocol  


 


Docusate Sodium  100 mg  06/10/21 10:00  06/14/21 09:58





  Docusate Sodium 100 Mg Cap  PO   100 mg





  BID KINJAL   Administration


 


Famotidine  10 mg  06/10/21 10:00  06/14/21 09:58





  Famotidine 10 Mg Tab  PO   10 mg





  BID KINJAL   Administration


 


Furosemide  40 mg  06/13/21 10:00  06/14/21 09:57





  Furosemide 40 Mg Tab  PO   40 mg





  QDAY KINJAL   Administration


 


Guaifenesin  600 mg  06/10/21 01:00  06/14/21 09:58





  Guaifenesin Er 600 Mg Tab  PO   600 mg





  BID KINJAL   Administration


 


Heparin Sodium (Porcine)  5,000 unit  06/10/21 10:00  06/14/21 09:57





  Heparin 5,000 Unit/1 Ml Vial  SUB-Q   5,000 unit





  BID KINJAL   Administration


 


Hydralazine HCl  75 mg  06/10/21 06:00  06/14/21 15:07





  Hydralazine 25 Mg Tab  PO   75 mg





  Q8HR KINJAL   Administration


 


Insulin Human Lispro  0 unit  06/10/21 07:30  06/14/21 13:38





  Insulin Lispro 100 Unit/Ml  SUB-Q   Not Given





  [facility]  





  Protocol  


 


Metoprolol Tartrate  25 mg  06/11/21 22:00  06/14/21 09:58





  Metoprolol Tartrate 25 Mg Tab  PO   25 mg





  BID KINJAL   Administration


 


Nifedipine  30 mg  06/10/21 10:00  06/14/21 09:58





  Nifedipine Xl 30 Mg Tab  PO   30 mg





  Q12HR KINJAL   Administration


 


Nitroglycerin  0.4 mg  06/09/21 23:42 





  Nitroglycerin 0.4 Mg Tab Subl  SL  





  .Q5MIN PRN  





  Chest Pain  


 


Ondansetron HCl  4 mg  06/09/21 23:40 





  Ondansetron 4 Mg/2 Ml Inj  IV  





  Q6H PRN  





  Nausea And Vomiting  


 


Pravastatin Sodium  80 mg  06/10/21 22:00  06/13/21 21:46





  Pravastatin 80 Mg Tab  PO   80 mg





  [facility] KINJAL   Administration


 


Sodium Chloride  10 ml  06/10/21 10:00  06/14/21 12:13





  Sodium Chloride 0.9% 10 Ml Flush Syringe  IV   10 ml





  BID KINJAL   Administration


 


Sodium Chloride  10 ml  06/09/21 23:40 





  Sodium Chloride 0.9% 10 Ml Flush Syringe  IV  





  PRN PRN  





  LINE FLUSH

## 2021-06-14 NOTE — PROGRESS NOTE
Assessment and Plan


Assessment and plan: 





Pneumonia


-CXR shows Interval development of confluent infiltrate in the right lung base 

likely represents an infectious process


-Blood Cultures no growth x 48 hours


-Continue IV Abx


-Supportive care





Acute CHF (new onset)


-BNP elevated at 9110 on admission


-CXR showed Interval development of small bilateral pleural effusions with 

associated compressive atelectasis


-Continue ASA and ACE, will start on BB


-Continue IV Lasix per cardiology recommendation


-Echo revealed EF 15-20%, LV mod dilated, mild diastolic dysfxn, mild MR, mod L 

pleural effusion. 





Elevated troponin


-Troponin elevated on admission





Acute hypoxic respiratory failure


-Etiology secondary to CHF and pneumonia


-Albuterol as needed


-Supplemental oxygen as needed





HTN


-Monitor BP


-Continue anti-hypertensive meds 





DM


-With hyperglycemia


-BG on admission 201


-POC BG monitoring


-SSI coverage prn


-HgbA1C pending





GI and DVT PPX


-On heparin and Pepcid





6/12/2021.  Patient with increasing creatinine.  Renal ultrasound negative for h

ydronephrosis.  Cardiology started Lasix twice daily.  No ACE inhibitor or ARB 

secondary to renal function.  Cardiology plans for ischemic evaluation on Monday

if stable.  Continue IV antibiotics.  Follow-up chest x-ray in a.m.





6/13/2021.  Creatinine slightly improved this morning.  Patient for ischemic 

evaluation on tomorrow.  Follow-up chest x-ray reveals resolution of previously 

seen bibasilar pulmonary opacities.  The lungs now are completely clear.  

Discontinue antibiotics.





6/14/2021.  Patient for ischemic evaluation with stress test this morning.  

Await results and cardiology recommendations for discharge.  Continue GDMT for 

acute systolic heart failure exacerbation.  Echo revealed EF 15-20%, LV mod 

dilated, mild diastolic dysfxn, mild MR, mod L pleural effusion.  Also, will 

discuss with nephrology plans regarding renal function.  Physical therapy 

evaluation reports patient with no acute skilled therapy needs.  Anticipate 

discharge later today or in a.m.





History


Interval history: 





No new issues overnight.





Hospitalist Physical





- Constitutional


Vitals: 


                                        











Temp Pulse Resp BP Pulse Ox


 


 98.5 F   79   20   152/81   98 


 


 06/14/21 04:00  06/14/21 06:05  06/14/21 04:00  06/14/21 06:05  06/14/21 04:00











General appearance: Present: no acute distress





- EENT


Eyes: Present: PERRL, EOM intact


ENT: hearing intact, clear oral mucosa, dentition normal





- Neck


Neck: Present: supple, normal ROM





- Respiratory


Respiratory effort: normal


Respiratory: bilateral: CTA





- Cardiovascular


Rhythm: regular


Heart Sounds: Present: S1 & S2.  Absent: gallop, rub





- Extremities


Extremities: no ischemia, No edema, Full ROM





- Abdominal


General gastrointestinal: soft, non-tender, non-distended, normal bowel sounds





- Integumentary


Integumentary: Present: clear, warm, dry





- Neurologic


Neurologic: CNII-XII intact, moves all extremities





HEART Score





- HEART Score


EKG: Non-specific


Age: 45-65


Risk factors: > 3 risk factors or hx of atherosclerotic disease


Troponin: 


                                        











Troponin T  0.036 ng/mL (0.00-0.029)  H  06/10/21  04:17    














Results





- Labs


CBC & Chem 7: 


                                 06/14/21 04:26





                                 06/14/21 04:26


Labs: 


                             Laboratory Last Values











WBC  8.3 K/mm3 (4.5-11.0)   06/14/21  04:26    


 


RBC  3.26 M/mm3 (3.65-5.03)  L  06/14/21  04:26    


 


Hgb  10.6 gm/dl (10.1-14.3)   06/14/21  04:26    


 


Hct  30.8 % (30.3-42.9)   06/14/21  04:26    


 


MCV  95 fl (79-97)   06/14/21  04:26    


 


MCH  33 pg (28-32)  H  06/14/21  04:26    


 


MCHC  34 % (30-34)   06/14/21  04:26    


 


RDW  14.1 % (13.2-15.2)   06/14/21  04:26    


 


Plt Count  358 K/mm3 (140-440)   06/14/21  04:26    


 


Lymph % (Auto)  27.2 % (13.4-35.0)   06/14/21  04:26    


 


Mono % (Auto)  12.9 % (0.0-7.3)  H  06/14/21  04:26    


 


Eos % (Auto)  5.5 % (0.0-4.3)  H  06/14/21  04:26    


 


Baso % (Auto)  1.2 % (0.0-1.8)   06/14/21  04:26    


 


Lymph # (Auto)  2.3 K/mm3 (1.2-5.4)   06/14/21  04:26    


 


Mono # (Auto)  1.1 K/mm3 (0.0-0.8)  H  06/14/21  04:26    


 


Eos # (Auto)  0.5 K/mm3 (0.0-0.4)  H  06/14/21  04:26    


 


Baso # (Auto)  0.1 K/mm3 (0.0-0.1)   06/14/21  04:26    


 


Seg Neutrophils %  53.2 % (40.0-70.0)   06/14/21  04:26    


 


Seg Neutrophils #  4.4 K/mm3 (1.8-7.7)   06/14/21  04:26    


 


Sodium  135 mmol/L (137-145)  L  06/14/21  04:26    


 


Potassium  4.2 mmol/L (3.6-5.0)   06/14/21  04:26    


 


Chloride  101.5 mmol/L ()   06/14/21  04:26    


 


Carbon Dioxide  21 mmol/L (22-30)  L  06/14/21  04:26    


 


Anion Gap  17 mmol/L  06/14/21  04:26    


 


BUN  52 mg/dL (7-17)  H  06/14/21  04:26    


 


Creatinine  2.0 mg/dL (0.6-1.2)  H  06/14/21  04:26    


 


Estimated GFR  30 ml/min  06/14/21  04:26    


 


BUN/Creatinine Ratio  26 %  06/14/21  04:26    


 


Glucose  125 mg/dL ()  H  06/14/21  04:26    


 


POC Glucose  191 mg/dL ()  H  06/13/21  20:28    


 


Calcium  8.4 mg/dL (8.4-10.2)   06/14/21  04:26    


 


Total Bilirubin  0.30 mg/dL (0.1-1.2)   06/09/21  14:27    


 


AST  22 units/L (5-40)   06/09/21  14:27    


 


ALT  29 units/L (7-56)   06/09/21  14:27    


 


Alkaline Phosphatase  73 units/L ()   06/09/21  14:27    


 


Troponin T  0.036 ng/mL (0.00-0.029)  H  06/10/21  04:17    


 


NT-Pro-B Natriuret Pep  9110 pg/mL (0-900)  H  06/09/21  14:27    


 


Total Protein  7.2 g/dL (6.3-8.2)   06/09/21  14:27    


 


Albumin  3.9 g/dL (3.9-5)   06/09/21  14:27    


 


Albumin/Globulin Ratio  1.2 %  06/09/21  14:27    


 


Triglycerides  92 mg/dL (2-149)   06/09/21  21:37    


 


Cholesterol  142 mg/dL ()   06/09/21  21:37    


 


LDL Cholesterol Direct  91 mg/dL ()   06/09/21  21:37    


 


HDL Cholesterol  53 mg/dL (40-59)   06/09/21  21:37    


 


Cholesterol/HDL Ratio  2.67 %  06/09/21  21:37    


 


Urine Color  Straw  (Yellow)   06/11/21  06:00    


 


Urine Turbidity  Clear  (Clear)   06/11/21  06:00    


 


Urine pH  6.0  (5.0-7.0)   06/11/21  06:00    


 


Ur Specific Gravity  1.009  (1.003-1.030)   06/11/21  06:00    


 


Urine Protein  <15 mg/dl mg/dL (Negative)   06/11/21  06:00    


 


Urine Glucose (UA)  Neg mg/dL (Negative)   06/11/21  06:00    


 


Urine Ketones  Neg mg/dL (Negative)   06/11/21  06:00    


 


Urine Blood  Neg  (Negative)   06/11/21  06:00    


 


Urine Nitrite  Neg  (Negative)   06/11/21  06:00    


 


Urine Bilirubin  Neg  (Negative)   06/11/21  06:00    


 


Urine Urobilinogen  < 2.0 mg/dL (<2.0)   06/11/21  06:00    


 


Ur Leukocyte Esterase  Neg  (Negative)   06/11/21  06:00    


 


Urine WBC (Auto)  < 1.0 /HPF (0.0-6.0)   06/11/21  06:00    


 


Urine RBC (Auto)  1.0 /HPF (0.0-6.0)   06/11/21  06:00    


 


U Epithel Cells (Auto)  1.0 /HPF (0-13.0)   06/11/21  06:00    


 


Urine Mucus  Few /HPF  06/11/21  06:00    


 


Urine Creatinine  49.1 mg/dL (0.1-20.0)  H  06/11/21  06:00    


 


Urine Sodium  97 mmol/L  06/11/21  06:00    


 


Nasal Screen MRSA (PCR)  Negative  (Negative)   06/10/21  Unknown











Microbiology: 


Microbiology





06/10/21 00:16   Peripheral/Venous   Blood Culture - Preliminary


                            NO GROWTH AFTER 4 DAYS


06/10/21 00:09   Peripheral/Venous   Blood Culture - Preliminary


                            NO GROWTH AFTER 4 DAYS








Ayon/IV: 


                                        





Voiding Method                   Toilet











Active Medications





- Current Medications


Current Medications: 














Generic Name Dose Route Start Last Admin





  Trade Name Freq  PRN Reason Stop Dose Admin


 


Acetaminophen  650 mg  06/09/21 23:40 





  Acetaminophen 325 Mg Tab  PO  





  Q4H PRN  





  Pain MILD(1-3)/Fever >100.5/HA  


 


Albuterol  2.5 mg  06/09/21 23:40 





  Albuterol 2.5 Mg/3 Ml Nebu  IH  





  Q3HRT PRN  





  Shortness Of Breath  


 


Aspirin  325 mg  06/10/21 10:00  06/13/21 09:15





  Aspirin Ec 325 Mg Tab  PO   325 mg





  QDAY KINJAL   Administration


 


Dextrose  50 ml  06/09/21 23:40 





  Dextrose 50% In Water (25gm) 50 Ml Syringe  IV  





  Q30MIN PRN  





  Hypoglycemia  





  Protocol  


 


Docusate Sodium  100 mg  06/10/21 10:00  06/13/21 21:47





  Docusate Sodium 100 Mg Cap  PO   100 mg





  BID KINJAL   Administration


 


Famotidine  10 mg  06/10/21 10:00  06/13/21 21:46





  Famotidine 10 Mg Tab  PO   10 mg





  BID KINJAL   Administration


 


Furosemide  40 mg  06/13/21 10:00  06/13/21 09:15





  Furosemide 40 Mg Tab  PO   40 mg





  QDAY KINJAL   Administration


 


Guaifenesin  600 mg  06/10/21 01:00  06/13/21 21:47





  Guaifenesin Er 600 Mg Tab  PO   600 mg





  BID KINJAL   Administration


 


Heparin Sodium (Porcine)  5,000 unit  06/10/21 10:00  06/13/21 21:48





  Heparin 5,000 Unit/1 Ml Vial  SUB-Q   5,000 unit





  BID KINJAL   Administration


 


Hydralazine HCl  75 mg  06/10/21 06:00  06/14/21 06:05





  Hydralazine 25 Mg Tab  PO   75 mg





  Q8HR KINJAL   Administration


 


Ceftriaxone Sodium  1 gm in 50 mls @ 100 mls/hr  06/10/21 10:00  06/13/21 09:14





  Rocephin/Ns 1 Gm/50 Ml  IV  06/14/21 10:29  100 mls/hr





  Q24HR KINJAL   Administration





  Protocol  


 


Insulin Human Lispro  0 unit  06/10/21 07:30  06/13/21 21:48





  Insulin Lispro 100 Unit/Ml  SUB-Q   2 unit





  ACHS KINJAL   Administration





  Protocol  


 


Metoprolol Tartrate  25 mg  06/11/21 22:00  06/13/21 21:47





  Metoprolol Tartrate 25 Mg Tab  PO   25 mg





  BID KINJAL   Administration


 


Nifedipine  30 mg  06/10/21 10:00  06/13/21 21:47





  Nifedipine Xl 30 Mg Tab  PO   30 mg





  Q12HR KINJAL   Administration


 


Nitroglycerin  0.4 mg  06/09/21 23:42 





  Nitroglycerin 0.4 Mg Tab Subl  SL  





  .Q5MIN PRN  





  Chest Pain  


 


Ondansetron HCl  4 mg  06/09/21 23:40 





  Ondansetron 4 Mg/2 Ml Inj  IV  





  Q6H PRN  





  Nausea And Vomiting  


 


Pravastatin Sodium  80 mg  06/10/21 22:00  06/13/21 21:46





  Pravastatin 80 Mg Tab  PO   80 mg





  QHS KINJAL   Administration


 


Sodium Chloride  10 ml  06/10/21 10:00  06/13/21 21:48





  Sodium Chloride 0.9% 10 Ml Flush Syringe  IV   10 ml





  BID KINJAL   Administration


 


Sodium Chloride  10 ml  06/09/21 23:40 





  Sodium Chloride 0.9% 10 Ml Flush Syringe  IV  





  PRN PRN  





  LINE FLUSH

## 2021-06-15 LAB
BASOPHILS # (AUTO): 0.1 K/MM3 (ref 0–0.1)
BASOPHILS # (AUTO): 0.1 K/MM3 (ref 0–0.1)
BASOPHILS NFR BLD AUTO: 0.9 % (ref 0–1.8)
BASOPHILS NFR BLD AUTO: 1.2 % (ref 0–1.8)
BUN SERPL-MCNC: 51 MG/DL (ref 7–17)
BUN/CREAT SERPL: 30 %
CALCIUM SERPL-MCNC: 8.7 MG/DL (ref 8.4–10.2)
EOSINOPHIL # BLD AUTO: 0.3 K/MM3 (ref 0–0.4)
EOSINOPHIL # BLD AUTO: 0.4 K/MM3 (ref 0–0.4)
EOSINOPHIL NFR BLD AUTO: 4.5 % (ref 0–4.3)
EOSINOPHIL NFR BLD AUTO: 5.3 % (ref 0–4.3)
HCT VFR BLD CALC: 32.6 % (ref 30.3–42.9)
HCT VFR BLD CALC: 33.3 % (ref 30.3–42.9)
HEMOLYSIS INDEX: 3
HGB BLD-MCNC: 11.2 GM/DL (ref 10.1–14.3)
HGB BLD-MCNC: 11.5 GM/DL (ref 10.1–14.3)
INR PPP: 0.9 (ref 0.87–1.13)
LYMPHOCYTES # BLD AUTO: 1.8 K/MM3 (ref 1.2–5.4)
LYMPHOCYTES # BLD AUTO: 2.1 K/MM3 (ref 1.2–5.4)
LYMPHOCYTES NFR BLD AUTO: 25.2 % (ref 13.4–35)
LYMPHOCYTES NFR BLD AUTO: 26.9 % (ref 13.4–35)
MCHC RBC AUTO-ENTMCNC: 34 % (ref 30–34)
MCHC RBC AUTO-ENTMCNC: 34 % (ref 30–34)
MCV RBC AUTO: 95 FL (ref 79–97)
MCV RBC AUTO: 95 FL (ref 79–97)
MONOCYTES # (AUTO): 1 K/MM3 (ref 0–0.8)
MONOCYTES # (AUTO): 1.1 K/MM3 (ref 0–0.8)
MONOCYTES % (AUTO): 13.4 % (ref 0–7.3)
MONOCYTES % (AUTO): 14.5 % (ref 0–7.3)
PLATELET # BLD: 360 K/MM3 (ref 140–440)
PLATELET # BLD: 388 K/MM3 (ref 140–440)
RBC # BLD AUTO: 3.44 M/MM3 (ref 3.65–5.03)
RBC # BLD AUTO: 3.52 M/MM3 (ref 3.65–5.03)

## 2021-06-15 RX ADMIN — HEPARIN SODIUM SCH UNIT: 5000 INJECTION, SOLUTION INTRAVENOUS; SUBCUTANEOUS at 10:04

## 2021-06-15 RX ADMIN — ASPIRIN SCH MG: 325 TABLET, COATED ORAL at 10:04

## 2021-06-15 RX ADMIN — INSULIN LISPRO SCH: 100 INJECTION, SOLUTION INTRAVENOUS; SUBCUTANEOUS at 19:10

## 2021-06-15 RX ADMIN — INSULIN LISPRO SCH: 100 INJECTION, SOLUTION INTRAVENOUS; SUBCUTANEOUS at 21:05

## 2021-06-15 RX ADMIN — HEPARIN SODIUM SCH UNIT: 5000 INJECTION, SOLUTION INTRAVENOUS; SUBCUTANEOUS at 21:04

## 2021-06-15 RX ADMIN — NIFEDIPINE SCH MG: 30 TABLET, FILM COATED, EXTENDED RELEASE ORAL at 10:03

## 2021-06-15 RX ADMIN — INSULIN LISPRO SCH UNIT: 100 INJECTION, SOLUTION INTRAVENOUS; SUBCUTANEOUS at 13:26

## 2021-06-15 RX ADMIN — DOCUSATE SODIUM SCH MG: 100 CAPSULE, LIQUID FILLED ORAL at 21:04

## 2021-06-15 RX ADMIN — DOCUSATE SODIUM SCH MG: 100 CAPSULE, LIQUID FILLED ORAL at 10:04

## 2021-06-15 RX ADMIN — METOPROLOL TARTRATE SCH MG: 25 TABLET, FILM COATED ORAL at 10:04

## 2021-06-15 RX ADMIN — Medication SCH ML: at 21:03

## 2021-06-15 RX ADMIN — FAMOTIDINE SCH MG: 10 TABLET ORAL at 21:03

## 2021-06-15 RX ADMIN — METOPROLOL TARTRATE SCH MG: 25 TABLET, FILM COATED ORAL at 21:03

## 2021-06-15 RX ADMIN — NIFEDIPINE SCH MG: 30 TABLET, FILM COATED, EXTENDED RELEASE ORAL at 21:03

## 2021-06-15 RX ADMIN — FAMOTIDINE SCH MG: 10 TABLET ORAL at 10:04

## 2021-06-15 RX ADMIN — INSULIN LISPRO SCH UNIT: 100 INJECTION, SOLUTION INTRAVENOUS; SUBCUTANEOUS at 08:28

## 2021-06-15 RX ADMIN — PRAVASTATIN SODIUM SCH MG: 80 TABLET ORAL at 21:03

## 2021-06-15 RX ADMIN — Medication SCH: at 11:16

## 2021-06-15 NOTE — PROGRESS NOTE
Assessment and Plan


1. Acute kidney injury:


JENNIFER in the setting of PNA and ?CHF.


Likely ATN.


UA bland.


Renal US negative for hydro.


Monitor renal function.


Creatinine level is improving.


Avoid diuretics.


Avoid nephrotoxic agents.


Meds dosage based on GFR.





2. FEN:


Anion-gap metabolic acidosis, monitor.


Appears euvolemic.


Monitor lytes and volume status.





3. Pneumonia:


S/p antibiotics.





4. CHF // Elevated troponin:


Followed by Cards.


Abnormal stress test, plan to do Cardiac cath 6/16 or 6/17.


D/w patient about the possible renal risks of IV contrast and she gave consent 

to proceed.





5. DM type 2.





6. Hypertension:


Monitor BP and adjust meds as needed.











Subjective:


Patient was seen and examined at the bedside.


Doing ok.











Examination:


General appearance: well-developed, appears stated age, not in distress


HEENT: atraumatic, JENA


Neck: trachea midline


Respiratory: ctab


Heart: S1S2, regular, no murmur


Abdomen: soft, bowel sounds heard, NT


Integumentary: no obvious rash


Neurologic: AO, non-focal


Ext: no edema








Subjective


Date of service: 06/15/21


Principal diagnosis: Acute HFrEF/New CMP





Objective





- Vital Signs


Vital signs: 


                               Vital Signs - 12hr











  06/14/21 06/14/21 06/14/21





  22:15 22:16 23:19


 


Temperature   98.0 F


 


Pulse Rate 80 80 71


 


Respiratory   14





Rate   


 


Blood Pressure 153/82 153/82 134/73


 


O2 Sat by Pulse   94





Oximetry   














  06/15/21 06/15/21 06/15/21





  03:00 03:32 06:45


 


Temperature  97.9 F 98.0 F


 


Pulse Rate 76 74 73


 


Respiratory  18 18





Rate   


 


Blood Pressure  136/74 139/74


 


O2 Sat by Pulse  94 97





Oximetry   














- Lab





                                 06/15/21 17:22





                                 06/15/21 04:59


                             Most recent lab results











Calcium  8.7 mg/dL (8.4-10.2)   06/15/21  04:59    


 


Urine Creatinine  49.1 mg/dL (0.1-20.0)  H  06/11/21  06:00    


 


Urine Sodium  97 mmol/L  06/11/21  06:00    














Medications & Allergies





- Medications


Allergies/Adverse Reactions: 


                                    Allergies





No Known Allergies Allergy (Verified 02/12/14 15:34)


   








Home Medications: 


                                Home Medications











 Medication  Instructions  Recorded  Confirmed  Last Taken  Type


 


lisinopriL [Zestril TAB] 20 mg PO QDAY #30 tablet 02/14/14 06/10/21 06/09/21 Rx


 


Bisoprolol/Hctz (Nf) [Ziac 5/6.25 1 tab PO QDAY 08/30/16 06/10/21 06/09/21 

History





(Nf)]     


 


Ibuprofen [Motrin 800 MG tab] 800 mg PO TID 08/30/16 06/10/21 11/06/16 History





    400 MG 


 


Multivit-Min/FA/Lycopen/Lutein 1 each PO QDAY 08/30/16 06/10/21 06/08/21 History





[Centrum Silver Tablet]     


 


Nesina 25 mg PO DAILY 09/02/16 06/10/21 11/07/16 History


 


HYDROcodone/APAP 5-325 [[place] 1 - 2 each PO Q4HR PRN #30 tablet 11/08/16 

06/10/21 Unknown Rx





5-325 mg TAB]     


 


Aspirin EC [Ecotrin] 325 mg PO QDAY #30 tablet 07/05/20 06/10/21 06/08/21 Rx


 


NIFEdipine XL [Procardia Xl] 30 mg PO Q12HR #60 tablet 07/05/20 06/10/21 

06/09/21 Rx


 


Pravastatin [Pravachol] 80 mg PO QHS #30 tablet 07/05/20 06/10/21 06/09/21 Rx


 


hydrALAZINE [Apresoline TAB] 75 mg PO Q8HR #90 tablet 07/05/20 06/10/21 06/08/21

Rx











Active Medications: 














Generic Name Dose Route Start Last Admin





  Trade Name Freq  PRN Reason Stop Dose Admin


 


Acetaminophen  650 mg  06/09/21 23:40 





  Acetaminophen 325 Mg Tab  PO  





  Q4H PRN  





  Pain MILD(1-3)/Fever >100.5/HA  


 


Albuterol  2.5 mg  06/09/21 23:40 





  Albuterol 2.5 Mg/3 Ml Nebu  IH  





  Q3HRT PRN  





  Shortness Of Breath  


 


Aspirin  325 mg  06/10/21 10:00  06/14/21 09:58





  Aspirin Ec 325 Mg Tab  PO   325 mg





  QDAY KINJAL   Administration


 


Dextrose  50 ml  06/09/21 23:40 





  Dextrose 50% In Water (25gm) 50 Ml Syringe  IV  





  Q30MIN PRN  





  Hypoglycemia  





  Protocol  


 


Docusate Sodium  100 mg  06/10/21 10:00  06/14/21 22:15





  Docusate Sodium 100 Mg Cap  PO   100 mg





  BID KINJAL   Administration


 


Famotidine  10 mg  06/10/21 10:00  06/14/21 22:16





  Famotidine 10 Mg Tab  PO   10 mg





  BID KINJAL   Administration


 


Guaifenesin  600 mg  06/10/21 01:00  06/14/21 22:15





  Guaifenesin Er 600 Mg Tab  PO   600 mg





  BID KINJAL   Administration


 


Heparin Sodium (Porcine)  5,000 unit  06/10/21 10:00  06/14/21 22:16





  Heparin 5,000 Unit/1 Ml Vial  SUB-Q   5,000 unit





  BID KINJAL   Administration


 


Hydralazine HCl  75 mg  06/10/21 06:00  06/14/21 22:15





  Hydralazine 25 Mg Tab  PO   75 mg





  Q8HR KINJAL   Administration


 


Insulin Human Lispro  0 unit  06/10/21 07:30  06/14/21 17:00





  Insulin Lispro 100 Unit/Ml  SUB-Q   4 unit





  ACHS KINJAL   Administration





  Protocol  


 


Metoprolol Tartrate  25 mg  06/11/21 22:00  06/14/21 22:16





  Metoprolol Tartrate 25 Mg Tab  PO   25 mg





  BID KINJAL   Administration


 


Nifedipine  30 mg  06/10/21 10:00  06/14/21 22:15





  Nifedipine Xl 30 Mg Tab  PO   30 mg





  Q12HR KINJAL   Administration


 


Nitroglycerin  0.4 mg  06/09/21 23:42 





  Nitroglycerin 0.4 Mg Tab Subl  SL  





  .Q5MIN PRN  





  Chest Pain  


 


Ondansetron HCl  4 mg  06/09/21 23:40 





  Ondansetron 4 Mg/2 Ml Inj  IV  





  Q6H PRN  





  Nausea And Vomiting  


 


Pravastatin Sodium  80 mg  06/10/21 22:00  06/14/21 22:16





  Pravastatin 80 Mg Tab  PO   80 mg





  QHS KINJAL   Administration


 


Sodium Chloride  10 ml  06/10/21 10:00  06/14/21 22:17





  Sodium Chloride 0.9% 10 Ml Flush Syringe  IV   10 ml





  BID KINJAL   Administration


 


Sodium Chloride  10 ml  06/09/21 23:40 





  Sodium Chloride 0.9% 10 Ml Flush Syringe  IV  





  PRN PRN  





  LINE FLUSH

## 2021-06-15 NOTE — PROGRESS NOTE
Assessment and Plan





Acute heart failure reduced ejection fraction in setting of new cardiomyopathy


* Patient is currently chest pain-free.  She appears to be nearing euvolemia.  

  Continue Lasix 40 mg p.o. daily.  Repeat BMP in a.m.


* Echo reviewed - EF 15-20%, LV mod dilated, mild diastolic dysfxn, mild MR, mod

  L pleural effusion. 


* Exercise MPI stress test (6/14/2021): Mild inferior apical ischemia.  

  Estimated EF is 35%


* Continue goal-directed therapy with cardioprotective regimen: , 

  metoprolol 25 mg twice daily, pravastatin 80.  Plan to resume ACEI once JENNIFER is

  resolved.  Refer for outpatient cardiac rehab.


* LifeVest placement ordered in setting of severe cardiomyopathy with 

  ventricular ectopy demonstrated on telemetry. Scheduled to be delivered and 

  placed today.


* We will plan for left heart cath once cleared by nephrology, anticipate 

  Thursday.  Creatinine is trending downward.  Repeat BMP in a.m.  Continue to 

  monitor on telemetry





Elevated Troponin


* Troponin is elevated x3, subacute nonspecific and stable.  Continue to trend 

  CE's





Acute Kidney Injury


* No ACE/ARB in setting of JENNIFER.  Avoid nephrotoxic agents.  Nephrology is 

  following





Respiratory distress in setting of pleural effusion


* Management per primary team





DVT prophylaxis


* Heparin SQ





Plan for left heart cath once cleared by nephrology. Pending lifevest placment. 

Will follow


Patient should follow-up with Dr TONI Brower, [facility] heart specialist in our

[place] office on 7/2/21 at 3:30 PM.  #[phone]





Pt seen in conjunction with Dr. ALISHA Lagos, who agrees with the assessment and 

plan of care. 





- Patient Problems


(1) Acute HFrEF (heart failure with reduced ejection fraction)


Current Visit: Yes   Status: Acute   





(2) Pleural effusion


Current Visit: Yes   Status: Acute   





(3) Cardiomyopathy


Current Visit: Yes   Status: Acute   





(4) JENNIFER (acute kidney injury)


Current Visit: Yes   Status: Acute   





(5) Elevated troponin


Current Visit: Yes   Status: Acute   





(6) HTN (hypertension)


Current Visit: Yes   Status: Chronic   


Qualifiers: 


   Hypertension type: essential hypertension   Qualified Code(s): I10 - 

Essential (primary) hypertension   





(7) HLD (hyperlipidemia)


Current Visit: Yes   Status: Chronic   


Qualifiers: 


   Hyperlipidemia type: mixed hyperlipidemia   Qualified Code(s): E78.2 - Mixed 

hyperlipidemia   





(8) DM2 (diabetes mellitus, type 2)


Current Visit: Yes   Status: Chronic   





(9) Carotid artery stenosis


Current Visit: Yes   Status: Chronic   





(10) Chronic back pain


Current Visit: Yes   Status: Chronic   








Subjective


Date of service: 06/15/21


Principal diagnosis: Acute HFrEF/New CMP


Interval history: 





Patient resting comfortably in bed.  No shortness of breath or chest pain 

overnight.


Telemetry reviewed: Sinus rhythm 81.  No events





Objective


                                        


                                Last Vital Signs











Temp  98.0 F   06/15/21 06:45


 


Pulse  73   06/15/21 07:38


 


Resp  18   06/15/21 06:45


 


BP  139/74   06/15/21 07:38


 


Pulse Ox  96   06/15/21 09:33

















- Physical Examination


General: No Apparent Distress


HEENT: Positive: EOMI, Normocephaly, Mucus Membranes Moist


Neck: Positive: neck supple, trachea midline.  Negative: JVD/HJR


Cardiac: Positive: Reg Rate and Rhythm, S1/S2


Lungs: Positive: Normal Exam, Normal Breath Sounds


Neuro: Positive: Grossly Intact


Abdomen: Positive: Soft.  Negative: Tender


Skin: Negative: Rash, Wound


Musculoskeletal: No Pain, Normal Range of Motion


Extremities: Present: lower extr. pulses.  Absent: edema





- Labs and Meds


                                       CBC











  06/15/21 Range/Units





  04:59 


 


WBC  7.7  (4.5-11.0)  K/mm3


 


RBC  3.52 L  (3.65-5.03)  M/mm3


 


Hgb  11.5  (10.1-14.3)  gm/dl


 


Hct  33.3  (30.3-42.9)  %


 


Plt Count  360  (140-440)  K/mm3


 


Lymph # (Auto)  2.1  (1.2-5.4)  K/mm3


 


Mono # (Auto)  1.0 H  (0.0-0.8)  K/mm3


 


Eos # (Auto)  0.4  (0.0-0.4)  K/mm3


 


Baso # (Auto)  0.1  (0.0-0.1)  K/mm3








                          Comprehensive Metabolic Panel











  06/15/21 Range/Units





  04:59 


 


Sodium  134 L  (137-145)  mmol/L


 


Potassium  4.7  (3.6-5.0)  mmol/L


 


Chloride  99.3  ()  mmol/L


 


Carbon Dioxide  21 L  (22-30)  mmol/L


 


BUN  51 H  (7-17)  mg/dL


 


Creatinine  1.7 H  (0.6-1.2)  mg/dL


 


Glucose  139 H  ()  mg/dL


 


Calcium  8.7  (8.4-10.2)  mg/dL














- Imaging and Cardiology


EKG: report reviewed, image reviewed


Echo: report reviewed (6/10/2021 - EF 15-20%, LV mod dilated, mild diastolic 

dysfxn, mild MR, mod L pleural effusion), other (7/2020 - EF 55-60%, mild 

concentric LVH, trace MR, trace AR, trace TR)





- Telemetry


EKG Rhythm: Sinus Rhythm





- EKG


Sinus rhythms and dysrhythmias: sinus rhythm


Chamber hypertrophy or enlargement: left ventricular hypertro


Repolarization changes or abnormalities: nonspecific abnormality, ST segment, 

and/or T wave

## 2021-06-15 NOTE — PROGRESS NOTE
Assessment and Plan


Assessment and plan: 





Pneumonia


-CXR shows Interval development of confluent infiltrate in the right lung base 

likely represents an infectious process


-Blood Cultures no growth x 48 hours


-Continue IV Abx


-Supportive care





Acute CHF (new onset)


-BNP elevated at 9110 on admission


-CXR showed Interval development of small bilateral pleural effusions with 

associated compressive atelectasis


-Continue ASA and ACE, will start on BB


-Continue IV Lasix per cardiology recommendation


-Echo revealed EF 15-20%, LV mod dilated, mild diastolic dysfxn, mild MR, mod L 

pleural effusion. 





Elevated troponin


-Troponin elevated on admission





Acute hypoxic respiratory failure


-Etiology secondary to CHF and pneumonia


-Albuterol as needed


-Supplemental oxygen as needed





HTN


-Monitor BP


-Continue anti-hypertensive meds 





DM


-With hyperglycemia


-BG on admission 201


-POC BG monitoring


-SSI coverage prn





JENNIFER


Nephrology following


Cr 1.7 today








GI and DVT PPX


-On heparin and Pepcid





6/12/2021.  Patient with increasing creatinine.  Renal ultrasound negative for 

hydronephrosis.  Cardiology started Lasix twice daily.  No ACE inhibitor or ARB 

secondary to renal function.  Cardiology plans for ischemic evaluation on Monday

if stable.  Continue IV antibiotics.  Follow-up chest x-ray in a.m.





6/13/2021.  Creatinine slightly improved this morning.  Patient for ischemic 

evaluation on tomorrow.  Follow-up chest x-ray reveals resolution of previously 

seen bibasilar pulmonary opacities.  The lungs now are completely clear.  

Discontinue antibiotics.





6/14/2021.  Patient for ischemic evaluation with stress test this morning.  

Await results and cardiology recommendations for discharge.  Continue GDMT for 

acute systolic heart failure exacerbation.  Echo revealed EF 15-20%, LV mod 

dilated, mild diastolic dysfxn, mild MR, mod L pleural effusion.  Also, will 

discuss with nephrology plans regarding renal function.  Physical therapy 

evaluation reports patient with no acute skilled therapy needs.  Anticipate 

discharge later today or in a.m.





6/15/2021   Patient initially presented with shortness of breath. She was 

diagnosed with acute systolic CHF with EF 15-20%. She had an abnormal stress 

test and needs cardiac cath. Cardiology waiting for clearance from Nephrology. 

Prob cath in 1-2 days. Cardiology also recommends life vest.


  








History


Interval history: 


Patient initially presented with shortness of breath








Hospitalist Physical





- Physical exam


Narrative exam: 


Gen: Not in acute distress, lying in bed


HEENT: Normochephalic, atraumatic


Neck:supple, 


Lungs: Clear, no rales


Heart:S1 and S2 reg, no murmurs, rubs or gallop


Abd: soft, non tender, non distended, normal bowel sounds


Ext: No edema, no clubbing, no cyanosis


Neuro:Awake,alert,oriented X 3, moves all ext, no focal neurological signs











- Constitutional


Vitals: 


                                        











Temp Pulse Resp BP Pulse Ox


 


 98.0 F   73   18   139/74   96 


 


 06/15/21 06:45  06/15/21 07:38  06/15/21 06:45  06/15/21 07:38  06/15/21 09:33











General appearance: Present: no acute distress





HEART Score





- HEART Score


EKG: Non-specific


Age: 45-65


Risk factors: > 3 risk factors or hx of atherosclerotic disease


Troponin: 


                                        











Troponin T  0.011 ng/mL (0.00-0.029)   06/15/21  04:59    














Results





- Labs


CBC & Chem 7: 


                                 06/15/21 04:59





                                 06/15/21 04:59


Labs: 


                             Laboratory Last Values











WBC  7.7 K/mm3 (4.5-11.0)   06/15/21  04:59    


 


RBC  3.52 M/mm3 (3.65-5.03)  L  06/15/21  04:59    


 


Hgb  11.5 gm/dl (10.1-14.3)   06/15/21  04:59    


 


Hct  33.3 % (30.3-42.9)   06/15/21  04:59    


 


MCV  95 fl (79-97)   06/15/21  04:59    


 


MCH  33 pg (28-32)  H  06/15/21  04:59    


 


MCHC  34 % (30-34)   06/15/21  04:59    


 


RDW  13.6 % (13.2-15.2)   06/15/21  04:59    


 


Plt Count  360 K/mm3 (140-440)   06/15/21  04:59    


 


Lymph % (Auto)  26.9 % (13.4-35.0)   06/15/21  04:59    


 


Mono % (Auto)  13.4 % (0.0-7.3)  H  06/15/21  04:59    


 


Eos % (Auto)  5.3 % (0.0-4.3)  H  06/15/21  04:59    


 


Baso % (Auto)  1.2 % (0.0-1.8)   06/15/21  04:59    


 


Lymph # (Auto)  2.1 K/mm3 (1.2-5.4)   06/15/21  04:59    


 


Mono # (Auto)  1.0 K/mm3 (0.0-0.8)  H  06/15/21  04:59    


 


Eos # (Auto)  0.4 K/mm3 (0.0-0.4)   06/15/21  04:59    


 


Baso # (Auto)  0.1 K/mm3 (0.0-0.1)   06/15/21  04:59    


 


Seg Neutrophils %  53.2 % (40.0-70.0)   06/15/21  04:59    


 


Seg Neutrophils #  4.1 K/mm3 (1.8-7.7)   06/15/21  04:59    


 


Sodium  134 mmol/L (137-145)  L  06/15/21  04:59    


 


Potassium  4.7 mmol/L (3.6-5.0)   06/15/21  04:59    


 


Chloride  99.3 mmol/L ()   06/15/21  04:59    


 


Carbon Dioxide  21 mmol/L (22-30)  L  06/15/21  04:59    


 


Anion Gap  18 mmol/L  06/15/21  04:59    


 


BUN  51 mg/dL (7-17)  H  06/15/21  04:59    


 


Creatinine  1.7 mg/dL (0.6-1.2)  H  06/15/21  04:59    


 


Estimated GFR  37 ml/min  06/15/21  04:59    


 


BUN/Creatinine Ratio  30 %  06/15/21  04:59    


 


Glucose  139 mg/dL ()  H  06/15/21  04:59    


 


POC Glucose  228 mg/dL ()  H  06/15/21  11:34    


 


Calcium  8.7 mg/dL (8.4-10.2)   06/15/21  04:59    


 


Total Bilirubin  0.30 mg/dL (0.1-1.2)   06/09/21  14:27    


 


AST  22 units/L (5-40)   06/09/21  14:27    


 


ALT  29 units/L (7-56)   06/09/21  14:27    


 


Alkaline Phosphatase  73 units/L ()   06/09/21  14:27    


 


Troponin T  0.011 ng/mL (0.00-0.029)   06/15/21  04:59    


 


NT-Pro-B Natriuret Pep  9110 pg/mL (0-900)  H  06/09/21  14:27    


 


Total Protein  7.2 g/dL (6.3-8.2)   06/09/21  14:27    


 


Albumin  3.9 g/dL (3.9-5)   06/09/21  14:27    


 


Albumin/Globulin Ratio  1.2 %  06/09/21  14:27    


 


Triglycerides  92 mg/dL (2-149)   06/09/21  21:37    


 


Cholesterol  142 mg/dL ()   06/09/21  21:37    


 


LDL Cholesterol Direct  91 mg/dL ()   06/09/21  21:37    


 


HDL Cholesterol  53 mg/dL (40-59)   06/09/21  21:37    


 


Cholesterol/HDL Ratio  2.67 %  06/09/21  21:37    


 


Urine Color  Straw  (Yellow)   06/11/21  06:00    


 


Urine Turbidity  Clear  (Clear)   06/11/21  06:00    


 


Urine pH  6.0  (5.0-7.0)   06/11/21  06:00    


 


Ur Specific Gravity  1.009  (1.003-1.030)   06/11/21  06:00    


 


Urine Protein  <15 mg/dl mg/dL (Negative)   06/11/21  06:00    


 


Urine Glucose (UA)  Neg mg/dL (Negative)   06/11/21  06:00    


 


Urine Ketones  Neg mg/dL (Negative)   06/11/21  06:00    


 


Urine Blood  Neg  (Negative)   06/11/21  06:00    


 


Urine Nitrite  Neg  (Negative)   06/11/21  06:00    


 


Urine Bilirubin  Neg  (Negative)   06/11/21  06:00    


 


Urine Urobilinogen  < 2.0 mg/dL (<2.0)   06/11/21  06:00    


 


Ur Leukocyte Esterase  Neg  (Negative)   06/11/21  06:00    


 


Urine WBC (Auto)  < 1.0 /HPF (0.0-6.0)   06/11/21  06:00    


 


Urine RBC (Auto)  1.0 /HPF (0.0-6.0)   06/11/21  06:00    


 


U Epithel Cells (Auto)  1.0 /HPF (0-13.0)   06/11/21  06:00    


 


Urine Mucus  Few /HPF  06/11/21  06:00    


 


Urine Creatinine  49.1 mg/dL (0.1-20.0)  H  06/11/21  06:00    


 


Urine Sodium  97 mmol/L  06/11/21  06:00    


 


Nasal Screen MRSA (PCR)  Negative  (Negative)   06/10/21  Unknown











Microbiology: 


Microbiology





06/10/21 00:16   Peripheral/Venous   Blood Culture - Final


                            NO GROWTH AFTER 5 DAYS


06/10/21 00:09   Peripheral/Venous   Blood Culture - Final


                            NO GROWTH AFTER 5 DAYS








Ayon/IV: 


                                        





Voiding Method                   Toilet











Active Medications





- Current Medications


Current Medications: 














Generic Name Dose Route Start Last Admin





  Trade Name Freq  PRN Reason Stop Dose Admin


 


Acetaminophen  650 mg  06/09/21 23:40 





  Acetaminophen 325 Mg Tab  PO  





  Q4H PRN  





  Pain MILD(1-3)/Fever >100.5/HA  


 


Albuterol  2.5 mg  06/09/21 23:40 





  Albuterol 2.5 Mg/3 Ml Nebu  IH  





  Q3HRT PRN  





  Shortness Of Breath  


 


Aspirin  325 mg  06/10/21 10:00  06/15/21 10:04





  Aspirin Ec 325 Mg Tab  PO   325 mg





  QDAY KINJAL   Administration


 


Dextrose  50 ml  06/09/21 23:40 





  Dextrose 50% In Water (25gm) 50 Ml Syringe  IV  





  Q30MIN PRN  





  Hypoglycemia  





  Protocol  


 


Docusate Sodium  100 mg  06/10/21 10:00  06/15/21 10:04





  Docusate Sodium 100 Mg Cap  PO   100 mg





  BID KINJAL   Administration


 


Famotidine  10 mg  06/10/21 10:00  06/15/21 10:04





  Famotidine 10 Mg Tab  PO   10 mg





  BID KINJAL   Administration


 


Guaifenesin  600 mg  06/10/21 01:00  06/15/21 10:04





  Guaifenesin Er 600 Mg Tab  PO   600 mg





  BID KINJAL   Administration


 


Heparin Sodium (Porcine)  5,000 unit  06/10/21 10:00  06/15/21 10:04





  Heparin 5,000 Unit/1 Ml Vial  SUB-Q   5,000 unit





  BID KINJAL   Administration


 


Hydralazine HCl  75 mg  06/10/21 06:00  06/15/21 07:38





  Hydralazine 25 Mg Tab  PO   75 mg





  Q8HR KINJAL   Administration


 


Insulin Human Lispro  0 unit  06/10/21 07:30  06/15/21 08:28





  Insulin Lispro 100 Unit/Ml  SUB-Q   2 unit





  ACHS KINJAL   Administration





  Protocol  


 


Metoprolol Tartrate  25 mg  06/11/21 22:00  06/15/21 10:04





  Metoprolol Tartrate 25 Mg Tab  PO   25 mg





  BID KINJAL   Administration


 


Nifedipine  30 mg  06/10/21 10:00  06/15/21 10:03





  Nifedipine Xl 30 Mg Tab  PO   30 mg





  Q12HR KINJAL   Administration


 


Nitroglycerin  0.4 mg  06/09/21 23:42 





  Nitroglycerin 0.4 Mg Tab Subl  SL  





  .Q5MIN PRN  





  Chest Pain  


 


Ondansetron HCl  4 mg  06/09/21 23:40 





  Ondansetron 4 Mg/2 Ml Inj  IV  





  Q6H PRN  





  Nausea And Vomiting  


 


Pravastatin Sodium  80 mg  06/10/21 22:00  06/14/21 22:16





  Pravastatin 80 Mg Tab  PO   80 mg





  QHS KINJAL   Administration


 


Sodium Chloride  10 ml  06/10/21 10:00  06/15/21 11:16





  Sodium Chloride 0.9% 10 Ml Flush Syringe  IV   Not Given





  BID KINJAL  


 


Sodium Chloride  10 ml  06/09/21 23:40 





  Sodium Chloride 0.9% 10 Ml Flush Syringe  IV  





  PRN PRN  





  LINE FLUSH

## 2021-06-16 VITALS — DIASTOLIC BLOOD PRESSURE: 86 MMHG | SYSTOLIC BLOOD PRESSURE: 154 MMHG

## 2021-06-16 LAB
BUN SERPL-MCNC: 47 MG/DL (ref 7–17)
BUN/CREAT SERPL: 31 %
CALCIUM SERPL-MCNC: 9.1 MG/DL (ref 8.4–10.2)
HEMOLYSIS INDEX: 13

## 2021-06-16 PROCEDURE — B2111ZZ FLUOROSCOPY OF MULTIPLE CORONARY ARTERIES USING LOW OSMOLAR CONTRAST: ICD-10-PCS | Performed by: INTERNAL MEDICINE

## 2021-06-16 PROCEDURE — 4A023N7 MEASUREMENT OF CARDIAC SAMPLING AND PRESSURE, LEFT HEART, PERCUTANEOUS APPROACH: ICD-10-PCS | Performed by: INTERNAL MEDICINE

## 2021-06-16 RX ADMIN — INSULIN LISPRO SCH: 100 INJECTION, SOLUTION INTRAVENOUS; SUBCUTANEOUS at 12:45

## 2021-06-16 RX ADMIN — VERAPAMIL HYDROCHLORIDE ONE MG: 2.5 INJECTION INTRAVENOUS at 10:51

## 2021-06-16 RX ADMIN — MIDAZOLAM ONE MG: 1 INJECTION INTRAMUSCULAR; INTRAVENOUS at 10:46

## 2021-06-16 RX ADMIN — FENTANYL CITRATE ONE MCG: 50 INJECTION, SOLUTION INTRAMUSCULAR; INTRAVENOUS at 10:33

## 2021-06-16 RX ADMIN — DOCUSATE SODIUM SCH MG: 100 CAPSULE, LIQUID FILLED ORAL at 12:54

## 2021-06-16 RX ADMIN — HEPARIN SODIUM ONE UNIT: 1000 INJECTION, SOLUTION INTRAVENOUS; SUBCUTANEOUS at 10:34

## 2021-06-16 RX ADMIN — INSULIN LISPRO SCH UNIT: 100 INJECTION, SOLUTION INTRAVENOUS; SUBCUTANEOUS at 16:21

## 2021-06-16 RX ADMIN — FAMOTIDINE SCH MG: 10 TABLET ORAL at 12:54

## 2021-06-16 RX ADMIN — HEPARIN SODIUM ONE UNIT: 1000 INJECTION, SOLUTION INTRAVENOUS; SUBCUTANEOUS at 10:51

## 2021-06-16 RX ADMIN — METOPROLOL TARTRATE SCH MG: 25 TABLET, FILM COATED ORAL at 12:53

## 2021-06-16 RX ADMIN — LIDOCAINE HYDROCHLORIDE ONE ML: 20 INJECTION, SOLUTION INFILTRATION; PERINEURAL at 10:34

## 2021-06-16 RX ADMIN — FENTANYL CITRATE ONE MCG: 50 INJECTION, SOLUTION INTRAMUSCULAR; INTRAVENOUS at 10:46

## 2021-06-16 RX ADMIN — VERAPAMIL HYDROCHLORIDE ONE MG: 2.5 INJECTION INTRAVENOUS at 10:35

## 2021-06-16 RX ADMIN — MIDAZOLAM ONE MG: 1 INJECTION INTRAMUSCULAR; INTRAVENOUS at 10:34

## 2021-06-16 RX ADMIN — LIDOCAINE HYDROCHLORIDE ONE ML: 20 INJECTION, SOLUTION INFILTRATION; PERINEURAL at 10:49

## 2021-06-16 NOTE — PROGRESS NOTE
Assessment and Plan


Assessment and plan: 


Coronary artery disease


LHC done today 6/16 reveal significant left main  disease


For transfer to [place]





Pneumonia


-CXR shows Interval development of confluent infiltrate in the right lung base 

likely represents an infectious process


-Blood Cultures no growth x 48 hours


-Continue IV Abx


-Supportive care





Acute CHF (new onset)


-BNP elevated at 9110 on admission


-CXR showed Interval development of small bilateral pleural effusions with 

associated compressive atelectasis


-Continue ASA and ACE, will start on BB


-Continue IV Lasix per cardiology recommendation


-Echo revealed EF 15-20%, LV mod dilated, mild diastolic dysfxn, mild MR, mod L 

pleural effusion. 





Elevated troponin


-Troponin elevated on admission





Acute hypoxic respiratory failure


-Etiology secondary to CHF and pneumonia


-Albuterol as needed


-Supplemental oxygen as needed





HTN


-Monitor BP


-Continue anti-hypertensive meds 





DM


-With hyperglycemia


-BG on admission 201


-POC BG monitoring


-SSI coverage prn





JENNIFER


Nephrology following


Cr 1.7 today








GI and DVT PPX


-On heparin and Pepcid





6/12/2021.  Patient with increasing creatinine.  Renal ultrasound negative for 

hydronephrosis.  Cardiology started Lasix twice daily.  No ACE inhibitor or ARB 

secondary to renal function.  Cardiology plans for ischemic evaluation on Monday

if stable.  Continue IV antibiotics.  Follow-up chest x-ray in a.m.





6/13/2021.  Creatinine slightly improved this morning.  Patient for ischemic 

evaluation on tomorrow.  Follow-up chest x-ray reveals resolution of previously 

seen bibasilar pulmonary opacities.  The lungs now are completely clear.  

Discontinue antibiotics.





6/14/2021.  Patient for ischemic evaluation with stress test this morning.  

Await results and cardiology recommendations for discharge.  Continue GDMT for 

acute systolic heart failure exacerbation.  Echo revealed EF 15-20%, LV mod 

dilated, mild diastolic dysfxn, mild MR, mod L pleural effusion.  Also, will 

discuss with nephrology plans regarding renal function.  Physical therapy eval

uation reports patient with no acute skilled therapy needs.  Anticipate 

discharge later today or in a.m.





6/15/2021   Patient initially presented with shortness of breath. She was 

diagnosed with acute systolic CHF with EF 15-20%. She had an abnormal stress 

test and needs cardiac cath. Cardiology waiting for clearance from Nephrology. 

Prob cath in 1-2 days. Cardiology also recommends life vest.





6/16/2021  Patient initially presented with SOB, diagnosed with acute systolic 

CHF with EF 15-20%. Cardiac cath done today reveal significant Left main disease

. Cardiology has arranged for her to be transferred to [place] for CABG. Dr. Grimes.  In mean time will transfer to ICU.


  








History


Interval history: 


Patient initially presented with shortness of breath


Had cardiac cath today








Hospitalist Physical





- Physical exam


Narrative exam: 


Gen: Not in acute distress, lying in bed


HEENT: Normochephalic, atraumatic


Neck:supple, 


Lungs: Clear, no rales


Heart:S1 and S2 reg, no murmurs, rubs or gallop


Abd: soft, non tender, non distended, normal bowel sounds


Ext: No edema, no clubbing, no cyanosis


Neuro:Awake,alert,oriented X 3, moves all ext, no focal neurological signs











- Constitutional


Vitals: 


                                        











Temp Pulse Resp BP Pulse Ox


 


 97.7 F   72   19   167/87   98 


 


 06/16/21 11:30  06/16/21 12:00  06/16/21 12:00  06/16/21 12:00  06/16/21 12:00











General appearance: Present: no acute distress





HEART Score





- HEART Score


EKG: Non-specific


Age: 45-65


Risk factors: > 3 risk factors or hx of atherosclerotic disease


Troponin: 


                                        











Troponin T  0.011 ng/mL (0.00-0.029)   06/15/21  04:59    














Results





- Labs


CBC & Chem 7: 


                                 06/15/21 17:22





                                 06/16/21 05:04


Labs: 


                             Laboratory Last Values











WBC  7.3 K/mm3 (4.5-11.0)   06/15/21  17:22    


 


RBC  3.44 M/mm3 (3.65-5.03)  L  06/15/21  17:22    


 


Hgb  11.2 gm/dl (10.1-14.3)   06/15/21  17:22    


 


Hct  32.6 % (30.3-42.9)   06/15/21  17:22    


 


MCV  95 fl (79-97)   06/15/21  17:22    


 


MCH  33 pg (28-32)  H  06/15/21  17:22    


 


MCHC  34 % (30-34)   06/15/21  17:22    


 


RDW  13.8 % (13.2-15.2)   06/15/21  17:22    


 


Plt Count  388 K/mm3 (140-440)   06/15/21  17:22    


 


Lymph % (Auto)  25.2 % (13.4-35.0)   06/15/21  17:22    


 


Mono % (Auto)  14.5 % (0.0-7.3)  H  06/15/21  17:22    


 


Eos % (Auto)  4.5 % (0.0-4.3)  H  06/15/21  17:22    


 


Baso % (Auto)  0.9 % (0.0-1.8)   06/15/21  17:22    


 


Lymph # (Auto)  1.8 K/mm3 (1.2-5.4)   06/15/21  17:22    


 


Mono # (Auto)  1.1 K/mm3 (0.0-0.8)  H  06/15/21  17:22    


 


Eos # (Auto)  0.3 K/mm3 (0.0-0.4)   06/15/21  17:22    


 


Baso # (Auto)  0.1 K/mm3 (0.0-0.1)   06/15/21  17:22    


 


Seg Neutrophils %  54.9 % (40.0-70.0)   06/15/21  17:22    


 


Seg Neutrophils #  4.0 K/mm3 (1.8-7.7)   06/15/21  17:22    


 


PT  12.7 Sec. (12.2-14.9)   06/15/21  17:22    


 


INR  0.90  (0.87-1.13)   06/15/21  17:22    


 


Sodium  136 mmol/L (137-145)  L  06/16/21  05:04    


 


Potassium  4.4 mmol/L (3.6-5.0)   06/16/21  05:04    


 


Chloride  102.7 mmol/L ()   06/16/21  05:04    


 


Carbon Dioxide  19 mmol/L (22-30)  L  06/16/21  05:04    


 


Anion Gap  19 mmol/L  06/16/21  05:04    


 


BUN  47 mg/dL (7-17)  H  06/16/21  05:04    


 


Creatinine  1.5 mg/dL (0.6-1.2)  H  06/16/21  05:04    


 


Estimated GFR  42 ml/min  06/16/21  05:04    


 


BUN/Creatinine Ratio  31 %  06/16/21  05:04    


 


Glucose  121 mg/dL ()  H  06/16/21  05:04    


 


POC Glucose  147 mg/dL ()  H  06/16/21  09:16    


 


Calcium  9.1 mg/dL (8.4-10.2)   06/16/21  05:04    


 


Total Bilirubin  0.30 mg/dL (0.1-1.2)   06/09/21  14:27    


 


AST  22 units/L (5-40)   06/09/21  14:27    


 


ALT  29 units/L (7-56)   06/09/21  14:27    


 


Alkaline Phosphatase  73 units/L ()   06/09/21  14:27    


 


Troponin T  0.011 ng/mL (0.00-0.029)   06/15/21  04:59    


 


NT-Pro-B Natriuret Pep  9110 pg/mL (0-900)  H  06/09/21  14:27    


 


Total Protein  7.2 g/dL (6.3-8.2)   06/09/21  14:27    


 


Albumin  3.9 g/dL (3.9-5)   06/09/21  14:27    


 


Albumin/Globulin Ratio  1.2 %  06/09/21  14:27    


 


Triglycerides  92 mg/dL (2-149)   06/09/21  21:37    


 


Cholesterol  142 mg/dL ()   06/09/21  21:37    


 


LDL Cholesterol Direct  91 mg/dL ()   06/09/21  21:37    


 


HDL Cholesterol  53 mg/dL (40-59)   06/09/21  21:37    


 


Cholesterol/HDL Ratio  2.67 %  06/09/21  21:37    


 


Urine Color  Straw  (Yellow)   06/11/21  06:00    


 


Urine Turbidity  Clear  (Clear)   06/11/21  06:00    


 


Urine pH  6.0  (5.0-7.0)   06/11/21  06:00    


 


Ur Specific Gravity  1.009  (1.003-1.030)   06/11/21  06:00    


 


Urine Protein  <15 mg/dl mg/dL (Negative)   06/11/21  06:00    


 


Urine Glucose (UA)  Neg mg/dL (Negative)   06/11/21  06:00    


 


Urine Ketones  Neg mg/dL (Negative)   06/11/21  06:00    


 


Urine Blood  Neg  (Negative)   06/11/21  06:00    


 


Urine Nitrite  Neg  (Negative)   06/11/21  06:00    


 


Urine Bilirubin  Neg  (Negative)   06/11/21  06:00    


 


Urine Urobilinogen  < 2.0 mg/dL (<2.0)   06/11/21  06:00    


 


Ur Leukocyte Esterase  Neg  (Negative)   06/11/21  06:00    


 


Urine WBC (Auto)  < 1.0 /HPF (0.0-6.0)   06/11/21  06:00    


 


Urine RBC (Auto)  1.0 /HPF (0.0-6.0)   06/11/21  06:00    


 


U Epithel Cells (Auto)  1.0 /HPF (0-13.0)   06/11/21  06:00    


 


Urine Mucus  Few /HPF  06/11/21  06:00    


 


Urine Creatinine  49.1 mg/dL (0.1-20.0)  H  06/11/21  06:00    


 


Urine Sodium  97 mmol/L  06/11/21  06:00    


 


Nasal Screen MRSA (PCR)  Negative  (Negative)   06/10/21  Unknown











Ayon/IV: 


                                        





Voiding Method                   Toilet











Active Medications





- Current Medications


Current Medications: 














Generic Name Dose Route Start Last Admin





  Trade Name Freq  PRN Reason Stop Dose Admin


 


Acetaminophen  650 mg  06/09/21 23:40 





  Acetaminophen 325 Mg Tab  PO  





  Q4H PRN  





  Pain MILD(1-3)/Fever >100.5/HA  


 


Albuterol  2.5 mg  06/09/21 23:40 





  Albuterol 2.5 Mg/3 Ml Nebu  IH  





  Q3HRT PRN  





  Shortness Of Breath  


 


Aspirin  325 mg  06/10/21 10:00  06/15/21 10:04





  Aspirin Ec 325 Mg Tab  PO   325 mg





  QDAY KINJAL   Administration


 


Dextrose  50 ml  06/09/21 23:40 





  Dextrose 50% In Water (25gm) 50 Ml Syringe  IV  





  Q30MIN PRN  





  Hypoglycemia  





  Protocol  


 


Docusate Sodium  100 mg  06/10/21 10:00  06/15/21 21:04





  Docusate Sodium 100 Mg Cap  PO   100 mg





  BID KINJAL   Administration


 


Famotidine  10 mg  06/10/21 10:00  06/15/21 21:03





  Famotidine 10 Mg Tab  PO   10 mg





  BID KINJAL   Administration


 


Guaifenesin  600 mg  06/10/21 01:00  06/15/21 21:03





  Guaifenesin Er 600 Mg Tab  PO   600 mg





  BID KINJAL   Administration


 


Heparin Sodium (Porcine)  2,700 unit  06/16/21 11:58 





  Heparin 10,000 Units/10 Ml Vial  40 unit/kg (2700 unit)  





  IV  





  Q6H PRN  





  Anti-Xa Assay < 0.1 units/ml  


 


Hydralazine HCl  75 mg  06/10/21 06:00  06/16/21 05:21





  Hydralazine 25 Mg Tab  PO   75 mg





  Q8HR KINJAL   Administration


 


Sodium Chloride  500 mls @ 50 mls/hr  06/16/21 10:00  06/16/21 10:02





  Nacl 0.9% 500 Ml  IV   50 mls/hr





  AS DIRECT KINJAL   Administration


 


Heparin Sodium/Sodium Chloride  25,000 unit in 500 mls @ 20 mls/hr  06/16/21 

12:00  06/16/21 12:25





  Heparin/ 0.45% Nacl-25,000 Unit/500 Ml  IV   1,000 units/hr





  TITRATE KINJAL   20 mls/hr





    Administration





  Protocol  





  1,000 UNITS/HR  


 


Insulin Human Lispro  0 unit  06/10/21 07:30  06/16/21 12:45





  Insulin Lispro 100 Unit/Ml  SUB-Q   Not Given





  [facility]  





  Protocol  


 


Metoprolol Tartrate  25 mg  06/11/21 22:00  06/15/21 21:03





  Metoprolol Tartrate 25 Mg Tab  PO   25 mg





  BID KINJAL   Administration


 


Nifedipine  30 mg  06/10/21 10:00  06/15/21 21:03





  Nifedipine Xl 30 Mg Tab  PO   30 mg





  Q12HR KINJAL   Administration


 


Nitroglycerin  0.4 mg  06/09/21 23:42 





  Nitroglycerin 0.4 Mg Tab Subl  SL  





  .Q5MIN PRN  





  Chest Pain  


 


Ondansetron HCl  4 mg  06/09/21 23:40 





  Ondansetron 4 Mg/2 Ml Inj  IV  





  Q6H PRN  





  Nausea And Vomiting  


 


Pravastatin Sodium  80 mg  06/10/21 22:00  06/15/21 21:03





  Pravastatin 80 Mg Tab  PO   80 mg





  [facility] KINJAL   Administration


 


Sodium Chloride  10 ml  06/10/21 10:00  06/15/21 21:03





  Sodium Chloride 0.9% 10 Ml Flush Syringe  IV   10 ml





  BID KINJAL   Administration


 


Sodium Chloride  10 ml  06/09/21 23:40 





  Sodium Chloride 0.9% 10 Ml Flush Syringe  IV  





  PRN PRN  





  LINE FLUSH

## 2021-06-16 NOTE — CARDIAC CATHERIZATION REPORT
DATE OF SERVICE: 06/16/2021



INDICATION:  A 65-year-old -American female with history of hypertension,

diabetes mellitus, presented to the Emergency Room with shortness of breath.  

Echocardiogram showed good ejection fraction around 20%.  Was diuresed and 

developed acute kidney injury, which is improving.  EKG showed sinus rhythm with

diffuse T-wave changes, maybe secondary to LVH versus ischemia.  Nuclear imaging

showed ejection fraction of 35% with evidence of anteroapical ischemia.  Hence, 

scheduled for cardiac catheterization.  The patient is aware of the procedure, 

potential complications, and the alternatives of therapy available.



DESCRIPTION OF PROCEDURE:  The patient was brought to the catheterization 

laboratory in a fasting condition.  Right wrist area was thoroughly cleansed 

with Betadine solution, sterile drapes were applied.  Local anesthesia was 

achieved using 2% Xylocaine.  Right radial artery was prepared and accessed with

21-gauge arterial puncture needle.  The patient prior to this was evaluated for 

moderate sedation and was felt to be appropriate candidate for moderate sedation

and received IV Versed and fentanyl.  Subsequently, after obtaining the access 

of the right radial artery, a 5-Northern Irish slender sheath was introduced.  A 

5-[place] Irish multipurpose catheter was used to obtain the angiograms of the right 

coronary artery followed by obtaining the angiograms of the left coronary artery

using a JL3.5 catheter.  Left ventriculogram was not performed considering her 

acute kidney injury.  Following finding were noted:

1.  Right coronary artery:  Total occlusion at the ostium after a right 

ventricular branch.  This is filling distal LAD.

2.  Left coronary artery:  Left main shows 60% to 70% focal distal disease.  LAD

showed some ectasia in the proximal part with significant lesion at the 

trifurcation into the septal and mid diagonal branch, probably significant 

lesion in this area 80% or more.  Also mid LAD has focal lesions approaching 70%

to 80%.  Proximal diagonal branch shows this is approaching 60% or so.  

Similarly marginal branches show 50% to 60% disease in the mid parts of the OM1 

and OM2.  Ramus branch shows a 60% long proximal lesion.  All these vessels are 

bypassable.  On LCA injection, right coronary artery is filling retrograde.



FINAL IMPRESSION:  LV gram was not performed.  Her echocardiogram showed severe 

LV dysfunction in the range of 20% to 25%.  Left main shows 60% to 70% ostial 

lesion in addition to mid LAD shows complex tight lesion with distal lesions.  

On RCA injection, distal LAD is filling retrograde.  Also, moderate disease 

noted in the marginal branch.  RCA is occluded with retrograde filling.



Considering the above anatomy with significant lesions at multiple sites, the 

patient would benefit from the surgical intervention.  The patient is a 

diabetic.  In addition, also has recent underlying acute kidney injury, which 

improved markedly.



The patient is hemodynamically stable without any chest pain.  Arrangements 

being performed to transfer to [facility] for bypass surgery.  

Left voice call to the patient's .



The patient's sedation started at 10:46 a.m. and continuously monitored with 

pulse oximetry, hemodynamic and EKG monitoring.  Sedation monitoring ended at 

10:59 a.m.  The patient is hemodynamically stable and communicating normally 

with no focal deficits.







DD: 06/16/2021 11:22 AM

DT: 06/16/2021 01:28 PM

TID: [id number] RECEIPT: [id number]

DAYANA/NELSON BELTRAN

## 2021-06-16 NOTE — PROGRESS NOTE
Assessment and Plan





Acute heart failure reduced ejection fraction in setting of new cardiomyopathy


* Patient is currently chest pain-free.  She appears to be nearing euvolemia.  

  Continue Lasix 40 mg p.o. daily.  Repeat BMP in a.m.


* Echo reviewed - EF 15-20%, LV mod dilated, mild diastolic dysfxn, mild MR, mod

  L pleural effusion. 


* Exercise MPI stress test (6/14/2021): Mild inferior apical ischemia.  

  Estimated EF is 35%


* Continue goal-directed therapy with cardioprotective regimen: , 

  metoprolol 25 mg twice daily, pravastatin 80.  Plan to resume ACEI once JENNIFER is

  resolved.  Refer for outpatient cardiac rehab.


* LifeVest placement is canceled pending CABG.


* [facility] reviewed (6/16/2021): Significant left main disease.  Patient to be 

  transferred to [place], Dr. Grimes accepting surgeon for CABG. currently waiting

  for available bed.  Recommend placement in [facility] 

  ICU until bed available.  Dr. Pedroza, intensivist consulted.  Initiate heparin

  drip standard titration.


* Status post [facility] and prescribed TR band removal patient has recurring hematoma 

  despite pressure dressing.  Stat arterial ultrasound of right upper limb is 

  pending





Acute Kidney Injury


* No ACE/ARB in setting of JENNIFER.  Avoid nephrotoxic agents.  Nephrology is 

  following





Respiratory distress in setting of pleural effusion


* Management per primary team





DVT prophylaxis


* Heparin SQ





Awaiting transfer to [place], Dr Grimes accepting surgeon. Request ICU bed until 

transfer.  Will follow


Patient should follow-up with Dr TONI Brower, [facility] heart specialist in our

[place] office on 7/2/21 at 3:30 PM.  #[phone]





Pt seen in conjunction with Dr. Yanes, who agrees with the assessment and plan

of care. 





- Patient Problems


(1) Acute HFrEF (heart failure with reduced ejection fraction)


Current Visit: Yes   Status: Acute   





(2) Pleural effusion


Current Visit: Yes   Status: Acute   





(3) Cardiomyopathy


Current Visit: Yes   Status: Acute   





(4) JENNIFER (acute kidney injury)


Current Visit: Yes   Status: Acute   





(5) Elevated troponin


Current Visit: Yes   Status: Acute   





(6) HTN (hypertension)


Current Visit: Yes   Status: Chronic   


Qualifiers: 


   Hypertension type: essential hypertension   Qualified Code(s): I10 - 

Essential (primary) hypertension   





(7) HLD (hyperlipidemia)


Current Visit: Yes   Status: Chronic   


Qualifiers: 


   Hyperlipidemia type: mixed hyperlipidemia   Qualified Code(s): E78.2 - Mixed 

hyperlipidemia   





(8) DM2 (diabetes mellitus, type 2)


Current Visit: Yes   Status: Chronic   





(9) Carotid artery stenosis


Current Visit: Yes   Status: Chronic   





(10) Chronic back pain


Current Visit: Yes   Status: Chronic   








Subjective


Date of service: 06/16/21


Principal diagnosis: Acute HFrEF/New CMP


Interval history: 





Patient resting comfortably in bed.  No shortness of breath or chest pain 

overnight.


Telemetry reviewed: Sinus rhythm 70.  No events





Objective


                                        


                                Last Vital Signs











Temp  97.7 F   06/16/21 11:30


 


Pulse  80   06/16/21 11:45


 


Resp  16   06/16/21 11:45


 


BP  171/92   06/16/21 11:45


 


Pulse Ox  96   06/16/21 11:45

















- Physical Examination


General: No Apparent Distress


HEENT: Positive: EOMI, Normocephaly, Mucus Membranes Moist


Neck: Positive: neck supple, trachea midline.  Negative: JVD/HJR


Cardiac: Positive: Reg Rate and Rhythm, S1/S2


Lungs: Positive: Normal Exam, Normal Breath Sounds


Neuro: Positive: Grossly Intact


Abdomen: Positive: Soft.  Negative: Tender


Skin: Negative: Rash, Wound


Musculoskeletal: No Pain, Normal Range of Motion


Extremities: Present: lower extr. pulses.  Absent: edema





- Labs and Meds


                                   Coagulation











  06/15/21 Range/Units





  17:22 


 


PT  12.7  (12.2-14.9)  Sec.


 


INR  0.90  (0.87-1.13)  








                                       CBC











  06/15/21 Range/Units





  17:22 


 


WBC  7.3  (4.5-11.0)  K/mm3


 


RBC  3.44 L  (3.65-5.03)  M/mm3


 


Hgb  11.2  (10.1-14.3)  gm/dl


 


Hct  32.6  (30.3-42.9)  %


 


Plt Count  388  (140-440)  K/mm3


 


Lymph # (Auto)  1.8  (1.2-5.4)  K/mm3


 


Mono # (Auto)  1.1 H  (0.0-0.8)  K/mm3


 


Eos # (Auto)  0.3  (0.0-0.4)  K/mm3


 


Baso # (Auto)  0.1  (0.0-0.1)  K/mm3








                          Comprehensive Metabolic Panel











  06/16/21 Range/Units





  05:04 


 


Sodium  136 L  (137-145)  mmol/L


 


Potassium  4.4  (3.6-5.0)  mmol/L


 


Chloride  102.7  ()  mmol/L


 


Carbon Dioxide  19 L  (22-30)  mmol/L


 


BUN  47 H  (7-17)  mg/dL


 


Creatinine  1.5 H  (0.6-1.2)  mg/dL


 


Glucose  121 H  ()  mg/dL


 


Calcium  9.1  (8.4-10.2)  mg/dL














- Imaging and Cardiology


EKG: report reviewed, image reviewed


Echo: report reviewed (6/10/2021 - EF 15-20%, LV mod dilated, mild diastolic 

dysfxn, mild MR, mod L pleural effusion), other (7/2020 - EF 55-60%, mild 

concentric LVH, trace MR, trace AR, trace TR)





- Telemetry


EKG Rhythm: Sinus Rhythm





- EKG


Sinus rhythms and dysrhythmias: sinus rhythm


Chamber hypertrophy or enlargement: left ventricular hypertro


Repolarization changes or abnormalities: nonspecific abnormality, ST segment, 

and/or T wave

## 2021-06-16 NOTE — PROGRESS NOTE
Assessment and Plan


1. Acute kidney injury:


JENNIFER in the setting of PNA and ?CHF.


Likely ATN.


UA bland.


Renal US negative for hydro.


Monitor renal function.


Creatinine level is improving.


Avoid diuretics.


Avoid nephrotoxic agents.


Meds dosage based on GFR.





2. FEN:


Anion-gap metabolic acidosis, monitor.


Appears euvolemic.


Monitor lytes and volume status.





3. Pneumonia:


S/p antibiotics.





4. CHF // Elevated troponin:


Followed by Cards.


Abnormal stress test, Cardiac cath today.


D/w patient (6/15) about the possible renal risks of IV contrast and she gave 

consent to proceed.





5. DM type 2.





6. Hypertension:


Monitor BP and adjust meds as needed.











Subjective:


Patient was seen and examined at the bedside.


Doing ok.











Examination:


General appearance: well-developed, appears stated age, not in distress


HEENT: atraumatic, JENA


Neck: trachea midline


Respiratory: ctab


Heart: S1S2, regular, no murmur


Abdomen: soft, bowel sounds heard, NT


Integumentary: no obvious rash


Neurologic: AO, non-focal


Ext: no edema








Subjective


Date of service: 06/16/21


Principal diagnosis: Acute HFrEF/New CMP





Objective





- Vital Signs


Vital signs: 


                               Vital Signs - 12hr











  06/15/21 06/16/21 06/16/21





  23:00 03:46 03:47


 


Temperature 97.4 F L 98.0 F 


 


Pulse Rate 69  79


 


Respiratory 14 16 





Rate   


 


Blood Pressure 128/65 148/75 


 


O2 Sat by Pulse 98  98





Oximetry   














  06/16/21 06/16/21





  05:21 08:05


 


Temperature  98.4 F


 


Pulse Rate 79 83


 


Respiratory  18





Rate  


 


Blood Pressure 148/75 158/80


 


O2 Sat by Pulse  99





Oximetry  














- Lab





                                 06/15/21 17:22





                                 06/16/21 05:04


                             Most recent lab results











Calcium  9.1 mg/dL (8.4-10.2)   06/16/21  05:04    


 


Urine Creatinine  49.1 mg/dL (0.1-20.0)  H  06/11/21  06:00    


 


Urine Sodium  97 mmol/L  06/11/21  06:00    














Medications & Allergies





- Medications


Allergies/Adverse Reactions: 


                                    Allergies





No Known Allergies Allergy (Verified 02/12/14 15:34)


   








Home Medications: 


                                Home Medications











 Medication  Instructions  Recorded  Confirmed  Last Taken  Type


 


lisinopriL [Zestril TAB] 20 mg PO QDAY #30 tablet 02/14/14 06/10/21 06/09/21 Rx


 


Bisoprolol/Hctz (Nf) [Ziac 5/6.25 1 tab PO QDAY 08/30/16 06/10/21 06/09/21 

History





(Nf)]     


 


Ibuprofen [Motrin 800 MG tab] 800 mg PO TID 08/30/16 06/10/21 11/06/16 History





    400 MG 


 


Multivit-Min/FA/Lycopen/Lutein 1 each PO QDAY 08/30/16 06/10/21 06/08/21 History





[Centrum Silver Tablet]     


 


Nesina 25 mg PO DAILY 09/02/16 06/10/21 11/07/16 History


 


HYDROcodone/APAP 5-325 [[place]wood 1 - 2 each PO Q4HR PRN #30 tablet 11/08/16 

06/10/21 Unknown Rx





5-325 mg TAB]     


 


Aspirin EC [Ecotrin] 325 mg PO QDAY #30 tablet 07/05/20 06/10/21 06/08/21 Rx


 


NIFEdipine XL [Procardia Xl] 30 mg PO Q12HR #60 tablet 07/05/20 06/10/21 

06/09/21 Rx


 


Pravastatin [Pravachol] 80 mg PO QHS #30 tablet 07/05/20 06/10/21 06/09/21 Rx


 


hydrALAZINE [Apresoline TAB] 75 mg PO Q8HR #90 tablet 07/05/20 06/10/21 06/08/21

Rx











Active Medications: 














Generic Name Dose Route Start Last Admin





  Trade Name Freq  PRN Reason Stop Dose Admin


 


Acetaminophen  650 mg  06/09/21 23:40 





  Acetaminophen 325 Mg Tab  PO  





  Q4H PRN  





  Pain MILD(1-3)/Fever >100.5/HA  


 


Albuterol  2.5 mg  06/09/21 23:40 





  Albuterol 2.5 Mg/3 Ml Nebu  IH  





  Q3HRT PRN  





  Shortness Of Breath  


 


Aspirin  325 mg  06/10/21 10:00  06/15/21 10:04





  Aspirin Ec 325 Mg Tab  PO   325 mg





  QDAY KINJAL   Administration


 


Dextrose  50 ml  06/09/21 23:40 





  Dextrose 50% In Water (25gm) 50 Ml Syringe  IV  





  Q30MIN PRN  





  Hypoglycemia  





  Protocol  


 


Docusate Sodium  100 mg  06/10/21 10:00  06/15/21 21:04





  Docusate Sodium 100 Mg Cap  PO   100 mg





  BID KINJAL   Administration


 


Famotidine  10 mg  06/10/21 10:00  06/15/21 21:03





  Famotidine 10 Mg Tab  PO   10 mg





  BID KINJAL   Administration


 


Guaifenesin  600 mg  06/10/21 01:00  06/15/21 21:03





  Guaifenesin Er 600 Mg Tab  PO   600 mg





  BID KINJAL   Administration


 


Heparin Sodium (Porcine)  5,000 unit  06/10/21 10:00  06/15/21 21:04





  Heparin 5,000 Unit/1 Ml Vial  SUB-Q   5,000 unit





  BID KINJAL   Administration


 


Hydralazine HCl  75 mg  06/10/21 06:00  06/16/21 05:21





  Hydralazine 25 Mg Tab  PO   75 mg





  Q8HR KINJAL   Administration


 


Sodium Chloride  500 mls @ 50 mls/hr  06/16/21 10:00  06/16/21 10:02





  Nacl 0.9% 500 Ml  IV   50 mls/hr





  AS DIRECT KINJAL   Administration


 


Insulin Human Lispro  0 unit  06/10/21 07:30  06/15/21 21:05





  Insulin Lispro 100 Unit/Ml  SUB-Q   Not Given





  [facility]  





  Protocol  


 


Metoprolol Tartrate  25 mg  06/11/21 22:00  06/15/21 21:03





  Metoprolol Tartrate 25 Mg Tab  PO   25 mg





  BID KINJAL   Administration


 


Nifedipine  30 mg  06/10/21 10:00  06/15/21 21:03





  Nifedipine Xl 30 Mg Tab  PO   30 mg





  Q12HR KINJAL   Administration


 


Nitroglycerin  0.4 mg  06/09/21 23:42 





  Nitroglycerin 0.4 Mg Tab Subl  SL  





  .Q5MIN PRN  





  Chest Pain  


 


Ondansetron HCl  4 mg  06/09/21 23:40 





  Ondansetron 4 Mg/2 Ml Inj  IV  





  Q6H PRN  





  Nausea And Vomiting  


 


Pravastatin Sodium  80 mg  06/10/21 22:00  06/15/21 21:03





  Pravastatin 80 Mg Tab  PO   80 mg





  QHS KINJAL   Administration


 


Sodium Chloride  10 ml  06/10/21 10:00  06/15/21 21:03





  Sodium Chloride 0.9% 10 Ml Flush Syringe  IV   10 ml





  BID KINJAL   Administration


 


Sodium Chloride  10 ml  06/09/21 23:40 





  Sodium Chloride 0.9% 10 Ml Flush Syringe  IV  





  PRN PRN  





  LINE FLUSH

## 2021-06-17 NOTE — NUCLEAR MEDICINE REPORT
--------------- APPROVED REPORT --------------





Exam: Nuclear Stress Test

Indication: Chest pain



BMI:  0



Stress Test Details

HR

Resting HR: 77 bpm

Max HR Achieved: 128 bpm

Max Heart Rate (APMHR): 156 bpm 

Target HR (85% APMHR): 132 bpm

% of APMHR: 82

HR response to stress: Normal HR response to stress



BP

Resting BP:  142/77 mmHg

Max BP:       161/83 mmHg

Recovery BP:       149/83 mmHg

BP response to stress: Normal blood pressure response to stress.

ECG

Resting ECG:  Sinus Rhythm

Stress ECG:     Sinus Tachycardia

ST Change: Nondiagnostic resting ST abnormalities

Arrhythmia:    None



Clinical

Reason for Termination: Fatigue

Stress Symptoms: None

Exercise duration: 5 min 17 sec

Exercise capacity: 6.1 METs

Overall Exercise Capacity for Age: Good



Stress ECG Conclusion

Non-diagnostic exercise stress due to baseline EKG changs.                      













NM EXAM: Myocardial Perfusion REST/STRESS

Imaging Protocol: Rest Tc-99m/Stress Tc-99m 1 day



Resting Data

Rest SPECT myocardial perfusion imaging was performed in supine position 45

minutes following the intravenous injection of 10 mCi of Tc-99m Myoview.        

Time of rest injection: 0705                                                    



Exercise Stress

At peak stress, the patient was injected intravenously with 28mCi of Tc-99m

Myoview.                                                                        

Time of stress injection: 0900                                                  

Gated Stress SPECT was performed 30 minutes after stress injection.             

The images were gated to evaluate regional wall motion and calculate left

ventricular ejection fraction.                                                  



Study Quality

Study: excellent

Lung Uptake: Normal                                            



Study Data

Post stress, the left ventricular ejection was 35%..

TID = 1.08.



Perfusion





Nuclear Conclusion

ECG Findings: non-diagnostic                                                    



Nuclear Findings: positive for ischemia                                         



Left Ventricular Function: abnormal                                             

Risk Study: high                                                                

Post stress, the left ventricular ejection was 35%..

There is a small area of  reduced uptake in the  segment of the inferior and

apical walsl which is seen on the stress images and normalizes on the resting

images.  These changes are consistent with stress induced ischemia.             















Conclusion

Non-diagnostic exercise stress due to baseline EKG changs.

## 2021-06-17 NOTE — ELECTROCARDIOGRAPH REPORT
[facility]

                                       

Test Date:    2021               Test Time:    23:18:54

Pat Name:     CAROL WISEMAN           Department:   

Patient ID:   [id number]          Room:         A485 1

Gender:       F                        Technician:   

:          1955               Requested By: LUCÍA WILLETT III

Order Number: [id number]              Reading MD:   Yesenia Jones

                                 Measurements

Intervals                              Axis          

Rate:         91                       P:            48

NJ:           192                      QRS:          20

QRSD:         94                       T:            185

QT:           404                                    

QTc:          497                                    

                           Interpretive Statements

Sinus rhythm

LVH with secondary repolarization abnormality

T wave inversions, consider lateral ischemia

No previous ECG available for comparison

Electronically Signed On 2021 11:04:37 EDT by Yesenia Jones

## 2021-06-18 NOTE — ELECTROCARDIOGRAPH REPORT
[facility]

                                       

Test Date:    2021               Test Time:    06:33:23

Pat Name:     CAROL WISEMAN           Department:   

Patient ID:   [id number]          Room:         A485 1

Gender:       F                        Technician:   KAR

:          1955               Requested By: FRANCISCO JAVIER NETTLES

Order Number: [id number]              Reading MD:   Stiven Yanes

                                 Measurements

Intervals                              Axis          

Rate:         74                       P:            54

[place]:           215                      QRS:          21

QRSD:         105                      T:            171

QT:           448                                    

QTc:          497                                    

                           Interpretive Statements

Sinus rhythm

Borderline prolonged [place] interval

LVH w/ repol abnormalities, possible ischemia

Compared to ECG 2021 23:18:54

Possible ischemia now present

No significant change noted.

Electronically Signed On 2021 19:18:41 EDT by Stiven Yanes

## 2021-06-30 NOTE — DISCHARGE SUMMARY
Providers





- Providers


Date of Admission: 


06/10/21 11:11





Date of discharge: 06/16/21


Attending physician: 


SHAYY WHITE





                                        





06/09/21 23:13


Consult to Physician [CONS] Routine 


   Comment: 


   Consulting Provider: TANK SHARMA


   Physician Instructions: 


   Reason For Exam: arf





06/09/21 23:40


Consult to Physician [CONS] Routine 


   Comment: 


   Consulting Provider: RONNY MARC


   Physician Instructions: 


   Reason For Exam: elevated trop, newonset chf





06/11/21 10:08


Physical Therapy Evaluation and Treat [CONS] Routine 


   Comment: 


   Reason For Exam: Deconditioning





06/14/21 11:27


Consult to Cardiac Rehabilitation [CONS] Routine 


   Reason For Exam: Cardiomyopathy


   Additional Notes/Special Instructions: Please evaluate patient for outpatient

 cardiac rehab in setting of new


                                            or worsening diagnosis of severe 

cardiomyopathy





06/16/21 11:50


Consult to Physician [CONS] Routine 


   Comment: s/p LHC with Left main occlusion and CMP 15%


   Consulting Provider: KARYN TOUSSAINT


   Physician Instructions: 


   Reason For Exam: Hemodynamic Instability











Primary care physician: 


DAYA PEGUERO








Hospitalization


Condition: Stable


Hospital course: 


Patient is 65-year-old -American female who was a former smoker (quit 6 

months ago) with history of hypertension, DM2, HLD, and chronic back pain who 

presents [facility] ED with complaints of cough and shortness of breath.  Patient 

complains of nonproductive cough x1 week, progressively worsening shortness of 

breath which started approximately 1 day ago.  Her shortness of breath is 

exacerbated with exertion and relieved with rest.  Denies bilateral lower 

extremity edema, peripheral edema, PND, or orthopnea.  Endorses receiving both 

doses of COVID-19 vaccine.  Patient also complains of fatigue with exertion.  

Denies any new exercise regiment or strenuous activity.  States that she quit 

smoking 6 months ago, and does not use any other form of tobacco products at t

his time. She was seen and evaluated in ED and diagnosed with acute CHF and 

pneumonia. She was started on Lasix, iv Antibiotics and admitted. Echo revealed 

EF 15-20%. Patient was evaluated by Cardiology and stress test done on 6/14/19 

revealed apical ischemia. Cardiac cath done on 6/16 after Creatinine stabilized.

 Cardiac cath revealed multi-vessel extensive coronary artery disease. 

Cardiology therefore recommended transfer to [place] for CABG. She was 

subsequently discharged to [place] on 6/16/21











Coronary artery disease


LHC done today 6/16 reveal significant multi vessel CAD


For transfer to [place]





Pneumonia


-CXR shows Interval development of confluent infiltrate in the right lung base 

likely represents an infectious process


-Blood Cultures no growth x 48 hours


-Continue IV Abx


-Supportive care





Acute CHF (new onset)


-BNP elevated at 9110 on admission


-CXR showed Interval development of small bilateral pleural effusions with 

associated compressive atelectasis


-Continue ASA and ACE, will start on BB


-Continue IV Lasix per cardiology recommendation


-Echo revealed EF 15-20%, LV mod dilated, mild diastolic dysfxn, mild MR, mod L 

pleural effusion. 





Elevated troponin


-Troponin elevated on admission





Acute hypoxic respiratory failure


-Etiology secondary to CHF and pneumonia


-Albuterol as needed


-Supplemental oxygen as needed





HTN


-Monitor BP


-Continue anti-hypertensive meds 





DM


-With hyperglycemia


-BG on admission 201


-POC BG monitoring


-SSI coverage prn





JENNIFER


Nephrology following











GI and DVT PPX


-On heparin and Pepcid





6/12/2021.  Patient with increasing creatinine.  Renal ultrasound negative for 

hydronephrosis.  Cardiology started Lasix twice daily.  No ACE inhibitor or ARB 

secondary to renal function.  Cardiology plans for ischemic evaluation on Monday

 if stable.  Continue IV antibiotics.  Follow-up chest x-ray in a.m.





6/13/2021.  Creatinine slightly improved this morning.  Patient for ischemic 

evaluation on tomorrow.  Follow-up chest x-ray reveals resolution of previously 

seen bibasilar pulmonary opacities.  The lungs now are completely clear.  

Discontinue antibiotics.





6/14/2021.  Patient for ischemic evaluation with stress test this morning.  

Await results and cardiology recommendations for discharge.  Continue GDMT for 

acute systolic heart failure exacerbation.  Echo revealed EF 15-20%, LV mod 

dilated, mild diastolic dysfxn, mild MR, mod L pleural effusion.  Also, will 

discuss with nephrology plans regarding renal function.  Physical therapy 

evaluation reports patient with no acute skilled therapy needs.  Anticipate 

discharge later today or in a.m.





6/15/2021   Patient initially presented with shortness of breath. She was 

diagnosed with acute systolic CHF with EF 15-20%. She had an abnormal stress 

test and needs cardiac cath. Cardiology waiting for clearance from Nephrology. 

Prob cath in 1-2 days. Cardiology also recommends life vest.





6/16/2021  Patient initially presented with SOB, diagnosed with acute systolic 

CHF with EF 15-20%. Cardiac cath done today reveal significant multivessel CAD. 

Cardiology has arranged for her to be transferred to [place] for CABG. Dr. Grimes.

  In mean time will transfer to ICU.


  








Disposition: [id number]/[id number] SHRT-TRM GEN HOSP IP


Final Discharge Diagnosis (Prints w/discharge instructions): 1.Extensive 

multivessel CAD.  2.Acute systolic CHF.  3.Pneumonia





- Discharge Diagnoses


(1) CAD (coronary atherosclerotic disease)


Status: Acute   


Qualifiers: 


   Coronary Disease-Associated Artery/Lesion type: native artery   Associated 

angina: without angina 





(2) JENNIFER (acute kidney injury)


Status: Acute   





(3) Acute HFrEF (heart failure with reduced ejection fraction)


Status: Acute   





(4) Cardiomyopathy


Status: Acute   





(5) Diabetes


Status: Acute   





(6) Pneumonia


Status: Acute   


Qualifiers: 


   Pneumonia type: due to unspecified organism   Laterality: right 





(7) Carotid artery stenosis


Status: Chronic   





(8) DM2 (diabetes mellitus, type 2)


Status: Chronic   





(9) HLD (hyperlipidemia)


Status: Chronic   


Qualifiers: 


   Hyperlipidemia type: mixed hyperlipidemia   Qualified Code(s): E78.2 - Mixed 

hyperlipidemia   





(10) HTN (hypertension)


Status: Chronic   


Qualifiers: 


   Hypertension type: essential hypertension   Qualified Code(s): I10 - 

Essential (primary) hypertension   





Core Measure Documentation





- Palliative Care


Palliative Care/ Comfort Measures: Not Applicable





- Core Measures


Any of the following diagnoses?: heart failure





- Heart Failure Discharge Requirements


ACE/ARB for LVSD if EF <40%: Yes


Beta blocker at discharge: Yes





Exam





- Constitutional


Vitals: 


                                        











Temp Pulse Resp BP Pulse Ox


 


 97.7 F   81   12   154/86   96 


 


 06/16/21 11:30  06/16/21 17:30  06/16/21 17:30  06/16/21 17:30  06/16/21 17:30














Plan


Follow up with: 


DAYA PEGUERO MD [Primary Care Provider] - 3-5 Days

## 2021-10-11 ENCOUNTER — HOSPITAL ENCOUNTER (OUTPATIENT)
Dept: HOSPITAL 5 - MAMMO | Age: 66
Discharge: HOME | End: 2021-10-11
Attending: FAMILY MEDICINE
Payer: COMMERCIAL

## 2021-10-11 DIAGNOSIS — Z12.31: Primary | ICD-10-CM

## 2021-10-11 DIAGNOSIS — N64.89: ICD-10-CM

## 2021-10-11 PROCEDURE — 77067 SCR MAMMO BI INCL CAD: CPT

## 2021-10-11 NOTE — MAMMOGRAPHY REPORT
DIGITAL SCREENING MAMMOGRAM WITH CAD, 10/11/2021



CLINICAL INFORMATION / INDICATION: Routine screening mammography. SCREENING MAMMOGRAM



TECHNIQUE:  Digital bilateral 2D mammography was obtained in the craniocaudal and mediolateral obliqu
e projections. This examination was interpreted with the benefit of Computer-Aided Detection analysis
.



COMPARISON: 5/17/2010 through 9/28/2020.



FINDINGS: 



Breast Density: The breasts are heterogeneously dense, which may obscure small masses.



No dominant mass, suspicious calcifications, or architectural distortion in either breast. 



There are benign calcifications bilaterally.

 

IMPRESSION: No mammographic evidence of malignancy.



Follow up recommendation: Routine yearly



BI-RADS Category 2:  Benign.





-------------------------------------------------------------------------------------------

A "normal" or negative report should not discourage follow up or biopsy of a clinically significant f
inding.



A written summary of these findings will be mailed to the patient. The patient will be entered into a
 mammography reporting system which will generate a reminder letter for the patient's next appointmen
t at the appropriate interval.



The [employer] recommends yearly mammograms starting at age 40 and continuing as l
augustine as a woman is in good health.  Breast MRI is recommended for women with an approximate 20-25% or 
greater lifetime risk of breast cancer, including women with a strong family history of breast or ova
daniela cancer or who have been treated for Hodgkin's disease.



Signer Name: Kirk Mireles MD 

Signed: 10/11/2021 11:46 AM

Workstation Name: [email or web address]

## 2022-03-29 ENCOUNTER — HOSPITAL ENCOUNTER (EMERGENCY)
Dept: HOSPITAL 5 - ED | Age: 67
Discharge: HOME | End: 2022-03-29
Payer: COMMERCIAL

## 2022-03-29 VITALS — DIASTOLIC BLOOD PRESSURE: 80 MMHG | SYSTOLIC BLOOD PRESSURE: 156 MMHG

## 2022-03-29 DIAGNOSIS — E11.9: ICD-10-CM

## 2022-03-29 DIAGNOSIS — Z79.899: ICD-10-CM

## 2022-03-29 DIAGNOSIS — I25.10: ICD-10-CM

## 2022-03-29 DIAGNOSIS — E78.5: ICD-10-CM

## 2022-03-29 DIAGNOSIS — M54.50: Primary | ICD-10-CM

## 2022-03-29 LAB
BILIRUB UR QL STRIP: (no result)
BLOOD UR QL VISUAL: (no result)
MUCOUS THREADS #/AREA URNS HPF: (no result) /HPF
PH UR STRIP: 6 [PH] (ref 5–7)
PROT UR STRIP-MCNC: (no result) MG/DL
RBC #/AREA URNS HPF: < 1 /HPF (ref 0–6)
UROBILINOGEN UR-MCNC: < 2 MG/DL (ref ?–2)
WBC #/AREA URNS HPF: < 1 /HPF (ref 0–6)

## 2022-03-29 PROCEDURE — 96372 THER/PROPH/DIAG INJ SC/IM: CPT

## 2022-03-29 PROCEDURE — 99283 EMERGENCY DEPT VISIT LOW MDM: CPT

## 2022-03-29 PROCEDURE — 81001 URINALYSIS AUTO W/SCOPE: CPT

## 2022-03-29 NOTE — EMERGENCY DEPARTMENT REPORT
ED Back Pain/Injury HPI





- General


Chief Complaint: Back Pain/Injury


Stated Complaint: BACK PAIN


Time Seen by Provider: 03/29/22 12:38


Source: patient


Limitations: No Limitations





- History of Present Illness


Initial Comments: 





Patient is a 66-year-old female comes to the emergency room complaining of low 

back pain radiating to her right leg.  She does work and she thought that maybe 

she was drinking too much coffee because she works third shift.  She denies 

fever or chills.  She denies any signs or symptoms of cauda equina.  She denies 

abdominal pain.  She denies chest pain or shortness of breath.





On arrival to the ER she is ambulatory, nontoxic and non-ill-appearing


MD Complaint: back pain





- Related Data


                                Home Medications











 Medication  Instructions  Recorded  Confirmed  Last Taken


 


Aspirin 81 mg PO DAILY 08/25/21 08/25/21 08/25/21 07:00


 


Centrum Adults Tablet PO DAILY 08/25/21 08/25/21 07:00


 


Clopidogrel 75 mg PO DAILY 08/25/21 08/25/21 08/25/21 07:00


 


Losartan/Hydrochlorothiazide 100 mg PO DAILY 08/25/21 08/25/21 08/25/21 07:00


 


Metformin HCl 1,000 mg PO BID 08/25/21 08/25/21 08/24/21 21:00


 


NIFEdipine XL [Procardia Xl] 30 mg PO DAILY 08/25/21 08/25/21 07:00


 


Spironolactone 25 mg PO DAILY 08/25/21 08/25/21 08/25/21 07:00


 


carvediloL [Coreg] 25 mg PO BID 08/25/21 08/25/21 08/25/21 07:00








                                  Previous Rx's











 Medication  Instructions  Recorded  Last Taken  Type


 


Pravastatin [Pravachol] 80 mg PO QHS #30 tablet 07/05/20 08/24/21 21:00 Rx


 


hydrALAZINE [Apresoline TAB] 75 mg PO Q8HR #90 tablet 07/05/20 08/24/21 21:00 Rx


 


Cyclobenzaprine [Flexeril] 10 mg PO TID PRN #10 tablet 03/29/22 Unknown Rx


 


Ibuprofen [Motrin] 800 mg PO Q8HR PRN #30 tablet 03/29/22 Unknown Rx


 


methylPREDNISolone [Medrol 4MG 4 mg PO FS #1 tab.ds.pk 03/29/22 Unknown Rx





DOSEPAK (21 tabs)]    











                                    Allergies











Allergy/AdvReac Type Severity Reaction Status Date / Time


 


No Known Allergies Allergy   Verified 03/29/22 12:31














ED Review of Systems


ROS: 


Stated complaint: BACK PAIN


Other details as noted in HPI





Comment: All other systems reviewed and negative





ED Past Medical Hx





- Past Medical History


Medical history: CAD, diabetes


hyperlipidemia


Psychiatric history: no pertinent history


OB/GYN history: no OB/GYN history


Family history: no significant family history





- Social History


Smoking Status: Never Smoker





ED Back Pain Physical Exam





- Exam


General: 


Vital signs noted. No distress. Alert and acting appropriately.





Back/Abdomen: No Abdominal Tenderness, No Perithoracic Tenderness, No Perilumbar

Tenderness, No Sacroiliac Tenderness, No Flank Tenderness, No Straight Leg Raise

Pain


Neuro: Yes Normal Sensation, Yes Normal DTR's, Yes Normal Gait, No Motor 

Weakness





ED Course


                                   Vital Signs











  03/29/22





  12:28


 


Temperature 97.8 F


 


Pulse Rate 69


 


Respiratory 15





Rate 


 


Blood Pressure 163/79


 


O2 Sat by Pulse 96





Oximetry 














ED Medical Decision Making





- Medical Decision Making





                                   Vital Signs











  03/29/22





  12:28


 


Temperature 97.8 F


 


Pulse Rate 69


 


Respiratory 15





Rate 


 


Blood Pressure 163/79


 


O2 Sat by Pulse 96





Oximetry 








                                   Lab Results











  03/29/22 Range/Units





  Unknown 


 


Urine Color  Straw  (Yellow)  


 


Urine Turbidity  Clear  (Clear)  


 


Urine pH  6.0  (5.0-7.0)  


 


Ur Specific Gravity  1.005  (1.003-1.030)  


 


Urine Protein  <15 mg/dl  (Negative)  mg/dL


 


Urine Glucose (UA)  50  (Negative)  mg/dL


 


Urine Ketones  Neg  (Negative)  mg/dL


 


Urine Blood  Neg  (Negative)  


 


Urine Nitrite  Neg  (Negative)  


 


Urine Bilirubin  Neg  (Negative)  


 


Urine Urobilinogen  < 2.0  (<2.0)  mg/dL


 


Ur Leukocyte Esterase  Neg  (Negative)  


 


Urine WBC (Auto)  < 1.0  (0.0-6.0)  /HPF


 


Urine RBC (Auto)  < 1.0  (0.0-6.0)  /HPF


 


U Epithel Cells (Auto)  < 1.0  (0-13.0)  /HPF


 


Urine Mucus  Few  /HPF








Urine noted to be normal.  





Patient medicated with Decadron IM for pain.  





Patient has been educated on proper body and back mechanics





Patient being discharged home with discharge plan of care including diet, 

activity, medications and follow-up.  She verbalizes understanding

















- Differential Diagnosis


Sciatica versus UTI


Critical care attestation.: 


If time is entered above; I have spent that time in minutes in the direct care 

of this critically ill patient, excluding procedure time.








ED Disposition


Clinical Impression: 


Low back pain


Qualifiers:


 Chronicity: unspecified Back pain laterality: right Sciatica presence: with 

sciatica 





Disposition: 01 HOME / SELF CARE / HOMELESS


Is pt being admited?: No


Does the pt Need Aspirin: No


Condition: Stable


Instructions:  Low Back Sprain or Strain Rehab-[facility]


Additional Instructions: 


Take your medications as ordered today





Warm compresses will help





Proper body mechanics as we discussed





Stay well-hydrated with water





Follow-up with PCP in 48 hours to make sure you are getting better.  There is a 

referral below





Continue home medication





Referrals: 


ZURI BENAVIDES MD [Staff Physician] - 3-5 Days


Forms:  Work/School Release Form(ED)


Time of Disposition: 13:13